# Patient Record
Sex: FEMALE | Race: WHITE | Employment: FULL TIME | URBAN - METROPOLITAN AREA
[De-identification: names, ages, dates, MRNs, and addresses within clinical notes are randomized per-mention and may not be internally consistent; named-entity substitution may affect disease eponyms.]

---

## 2018-07-22 ENCOUNTER — OFFICE VISIT (OUTPATIENT)
Dept: FAMILY MEDICINE CLINIC | Age: 23
End: 2018-07-22
Payer: COMMERCIAL

## 2018-07-22 VITALS
BODY MASS INDEX: 23.92 KG/M2 | SYSTOLIC BLOOD PRESSURE: 90 MMHG | OXYGEN SATURATION: 96 % | DIASTOLIC BLOOD PRESSURE: 60 MMHG | HEIGHT: 63 IN | HEART RATE: 63 BPM | TEMPERATURE: 98 F | WEIGHT: 135 LBS

## 2018-07-22 DIAGNOSIS — H61.22 IMPACTED CERUMEN OF LEFT EAR: ICD-10-CM

## 2018-07-22 DIAGNOSIS — J40 SINOBRONCHITIS: Primary | ICD-10-CM

## 2018-07-22 DIAGNOSIS — J32.9 SINOBRONCHITIS: Primary | ICD-10-CM

## 2018-07-22 PROCEDURE — 99213 OFFICE O/P EST LOW 20 MIN: CPT | Performed by: NURSE PRACTITIONER

## 2018-07-22 PROCEDURE — 69210 REMOVE IMPACTED EAR WAX UNI: CPT | Performed by: NURSE PRACTITIONER

## 2018-07-22 RX ORDER — DOXYCYCLINE HYCLATE 100 MG
100 TABLET ORAL 2 TIMES DAILY
Qty: 20 TABLET | Refills: 0 | Status: SHIPPED | OUTPATIENT
Start: 2018-07-22 | End: 2018-08-01

## 2018-07-22 RX ORDER — CITALOPRAM 10 MG/1
TABLET ORAL
COMMUNITY
Start: 2018-07-05 | End: 2020-01-30

## 2018-07-22 RX ORDER — NORGESTIMATE AND ETHINYL ESTRADIOL 7DAYSX3 28
KIT ORAL
COMMUNITY
Start: 2018-04-30 | End: 2020-03-16

## 2018-07-22 RX ORDER — METHYLPREDNISOLONE 4 MG/1
TABLET ORAL
Qty: 1 KIT | Refills: 0 | Status: SHIPPED | OUTPATIENT
Start: 2018-07-22 | End: 2018-07-28

## 2018-07-22 RX ORDER — SERTRALINE HYDROCHLORIDE 25 MG/1
TABLET, FILM COATED ORAL
COMMUNITY
Start: 2018-05-30 | End: 2021-03-17

## 2018-07-22 RX ORDER — CYCLOSPORINE 0.5 MG/ML
EMULSION OPHTHALMIC
COMMUNITY
Start: 2018-04-28 | End: 2021-07-03

## 2019-12-27 ENCOUNTER — HOSPITAL ENCOUNTER (OUTPATIENT)
Age: 24
Discharge: HOME | End: 2019-12-29
Payer: COMMERCIAL

## 2019-12-27 ENCOUNTER — OFFICE VISIT (OUTPATIENT)
Dept: FAMILY MEDICINE CLINIC | Age: 24
End: 2019-12-27
Payer: COMMERCIAL

## 2019-12-27 VITALS
WEIGHT: 149 LBS | OXYGEN SATURATION: 97 % | SYSTOLIC BLOOD PRESSURE: 122 MMHG | HEART RATE: 73 BPM | BODY MASS INDEX: 26.39 KG/M2 | DIASTOLIC BLOOD PRESSURE: 60 MMHG

## 2019-12-27 DIAGNOSIS — N76.0 ACUTE VAGINITIS: ICD-10-CM

## 2019-12-27 DIAGNOSIS — N76.0 ACUTE VAGINITIS: Primary | ICD-10-CM

## 2019-12-27 DIAGNOSIS — N89.8 VAGINAL DISCHARGE: ICD-10-CM

## 2019-12-27 LAB
BILIRUBIN, POC: NEGATIVE
BLOOD URINE, POC: NEGATIVE
CLARITY, POC: CLEAR
COLOR, POC: NORMAL
CONTROL: NORMAL
GLUCOSE URINE, POC: NORMAL
KETONES, POC: NEGATIVE
LEUKOCYTE EST, POC: NORMAL
NITRITE, POC: NEGATIVE
PH, POC: 6.5
PREGNANCY TEST URINE, POC: NORMAL
PROTEIN, POC: NEGATIVE
SPECIFIC GRAVITY, POC: 1.02
UROBILINOGEN, POC: 0.2

## 2019-12-27 PROCEDURE — 87491 CHLMYD TRACH DNA AMP PROBE: CPT

## 2019-12-27 PROCEDURE — 87088 URINE BACTERIA CULTURE: CPT

## 2019-12-27 PROCEDURE — 87070 CULTURE OTHR SPECIMN AEROBIC: CPT

## 2019-12-27 PROCEDURE — 81025 URINE PREGNANCY TEST: CPT | Performed by: FAMILY MEDICINE

## 2019-12-27 PROCEDURE — 99214 OFFICE O/P EST MOD 30 MIN: CPT | Performed by: FAMILY MEDICINE

## 2019-12-27 PROCEDURE — 87210 SMEAR WET MOUNT SALINE/INK: CPT

## 2019-12-27 PROCEDURE — 81002 URINALYSIS NONAUTO W/O SCOPE: CPT | Performed by: FAMILY MEDICINE

## 2019-12-27 PROCEDURE — 87591 N.GONORRHOEAE DNA AMP PROB: CPT

## 2019-12-27 RX ORDER — METRONIDAZOLE 500 MG/1
500 TABLET ORAL 2 TIMES DAILY
Qty: 14 TABLET | Refills: 0 | Status: SHIPPED | OUTPATIENT
Start: 2019-12-27 | End: 2020-01-03

## 2019-12-27 RX ORDER — FLUCONAZOLE 150 MG/1
TABLET ORAL
Qty: 2 TABLET | Refills: 0 | Status: SHIPPED | OUTPATIENT
Start: 2019-12-27 | End: 2020-01-30

## 2019-12-27 RX ORDER — VENLAFAXINE HYDROCHLORIDE 75 MG/1
CAPSULE, EXTENDED RELEASE ORAL
COMMUNITY
Start: 2019-12-17 | End: 2020-01-30

## 2019-12-30 LAB — URINE CULTURE, ROUTINE: NORMAL

## 2019-12-31 LAB
GENITAL CULTURE, ROUTINE: ABNORMAL
GENITAL CULTURE, ROUTINE: ABNORMAL
ORGANISM: ABNORMAL

## 2020-01-01 LAB — CULTURE, TRICHOMONAS: NORMAL

## 2020-01-02 ENCOUNTER — TELEPHONE (OUTPATIENT)
Dept: FAMILY MEDICINE CLINIC | Age: 25
End: 2020-01-02

## 2020-01-02 ENCOUNTER — TELEPHONE (OUTPATIENT)
Dept: PRIMARY CARE CLINIC | Age: 25
End: 2020-01-02

## 2020-01-02 RX ORDER — AZITHROMYCIN 500 MG/1
1000 TABLET, FILM COATED ORAL ONCE
Qty: 2 TABLET | Refills: 0 | Status: SHIPPED | OUTPATIENT
Start: 2020-01-02 | End: 2020-01-03

## 2020-01-03 LAB
C TRACH DNA GENITAL QL NAA+PROBE: ABNORMAL
N. GONORRHOEAE DNA: NEGATIVE
SOURCE: ABNORMAL

## 2020-01-03 RX ORDER — AZITHROMYCIN 500 MG/1
1000 TABLET, FILM COATED ORAL ONCE
Qty: 2 TABLET | Refills: 0 | Status: SHIPPED | OUTPATIENT
Start: 2020-01-03 | End: 2020-01-03

## 2020-01-30 ENCOUNTER — OFFICE VISIT (OUTPATIENT)
Dept: FAMILY MEDICINE CLINIC | Age: 25
End: 2020-01-30
Payer: COMMERCIAL

## 2020-01-30 VITALS
SYSTOLIC BLOOD PRESSURE: 122 MMHG | DIASTOLIC BLOOD PRESSURE: 80 MMHG | BODY MASS INDEX: 26.75 KG/M2 | HEART RATE: 80 BPM | TEMPERATURE: 97.9 F | WEIGHT: 151 LBS | OXYGEN SATURATION: 98 % | HEIGHT: 63 IN

## 2020-01-30 PROCEDURE — 99213 OFFICE O/P EST LOW 20 MIN: CPT | Performed by: FAMILY MEDICINE

## 2020-01-30 RX ORDER — DOXYCYCLINE HYCLATE 100 MG
100 TABLET ORAL 2 TIMES DAILY
Qty: 14 TABLET | Refills: 0 | Status: SHIPPED | OUTPATIENT
Start: 2020-01-30 | End: 2020-02-06

## 2020-01-30 RX ORDER — PREDNISONE 10 MG/1
TABLET ORAL
Qty: 30 TABLET | Refills: 0 | Status: SHIPPED | OUTPATIENT
Start: 2020-01-30 | End: 2020-02-11

## 2020-01-30 ASSESSMENT — ENCOUNTER SYMPTOMS
SORE THROAT: 0
SINUS PRESSURE: 1
VOICE CHANGE: 1
VOMITING: 0
WHEEZING: 0
EYE DISCHARGE: 0
RHINORRHEA: 0
SINUS PAIN: 1
ABDOMINAL PAIN: 0
COUGH: 0
CONSTIPATION: 0
NAUSEA: 0
SHORTNESS OF BREATH: 0
DIARRHEA: 0

## 2020-01-30 NOTE — PROGRESS NOTES
20  Laruel Carson : 1995 Sex: female  Age: 31 y.o. Chief Complaint   Patient presents with    Head Congestion     x1 week      HPI:  31 y.o. female presents for acute visit due to URI symptoms. Upper Respiratory Symptoms  Patient complains of congestion, nasal blockage, post nasal drip, bilateral sinus pain, hoarseness and sneezing. Patient denies anorexia, chest pain, chills, dizziness, fatigue, fevers, myalgias, nausea, shortness of breath, vomiting, cough and sputum production. She has had symptoms for 1 week. Symptoms have gradually worsening since that time. She has tried Claritin, Mucinex and steroid nasal spray at home with little improvement in symptoms. She denies a history of asthma. She has had recent close exposure to someone with similar symptoms. She works in the [employer] and is frequently around sick patients. Past history is significant for no history of pneumonia or bronchitis. Patient is non-smoker. ROS:  Review of Systems   Constitutional: Negative for appetite change, chills and fever. HENT: Positive for congestion, postnasal drip, sinus pressure, sinus pain, sneezing and voice change. Negative for ear pain, rhinorrhea and sore throat. Eyes: Negative for discharge. Respiratory: Negative for cough, shortness of breath and wheezing. Cardiovascular: Negative for chest pain and palpitations. Gastrointestinal: Negative for abdominal pain, constipation, diarrhea, nausea and vomiting. Musculoskeletal: Negative for myalgias. Skin: Negative for rash. Neurological: Negative for dizziness and headaches. All other systems reviewed and are negative.        Current Outpatient Medications:     doxycycline hyclate (VIBRA-TABS) 100 MG tablet, Take 1 tablet by mouth 2 times daily for 7 days, Disp: 14 tablet, Rfl: 0    predniSONE (DELTASONE) 10 MG tablet, Take 4 tablets by mouth daily for 3 days, THEN 3 tablets daily for 3 days, THEN 2 tablets daily for 3 days, THEN 1 tablet daily for 3 days. , Disp: 30 tablet, Rfl: 0    TRI-ESTARYLLA 0.18/0.215/0.25 MG-35 MCG TABS, , Disp: , Rfl:     ferrous sulfate 325 (65 FE) MG tablet, Take 1 tablet by mouth 2 times daily (with meals). , Disp: 30 tablet, Rfl: 11    RESTASIS 0.05 % ophthalmic emulsion, , Disp: , Rfl:     sertraline (ZOLOFT) 25 MG tablet, , Disp: , Rfl:     Allergies   Allergen Reactions    Penicillins Anaphylaxis    Cephalosporins        Past Medical History:   Diagnosis Date    Iron deficiency anemia due to chronic blood loss 5/4/2016    Exercise-induced, followed by  in [place]      History reviewed. No pertinent surgical history. History reviewed. No pertinent family history. Social History     Tobacco History     Smoking Status  Never Smoker    Smokeless Tobacco Use  Never Used                 Vitals:    01/30/20 1303   BP: 122/80   Pulse: 80   Temp: 97.9 °F (36.6 °C)   SpO2: 98%   Weight: 151 lb (68.5 kg)   Height: 5' 3\" (1.6 m)       Physical Exam:  Physical Exam  Vitals signs and nursing note reviewed. Constitutional:       General: She is not in acute distress. Appearance: She is well-developed and normal weight. She is ill-appearing. HENT:      Head: Normocephalic and atraumatic. Right Ear: Hearing and external ear normal. There is impacted cerumen. Left Ear: Hearing and external ear normal. There is impacted cerumen. Nose: Congestion present. No mucosal edema or rhinorrhea. Right Sinus: No maxillary sinus tenderness or frontal sinus tenderness. Left Sinus: No maxillary sinus tenderness or frontal sinus tenderness. Mouth/Throat:      Mouth: Mucous membranes are moist.      Pharynx: Oropharynx is clear. Posterior oropharyngeal erythema present. No oropharyngeal exudate. Eyes:      General: No scleral icterus. Right eye: No discharge. Left eye: No discharge. Extraocular Movements: Extraocular movements intact.

## 2021-03-17 ENCOUNTER — OFFICE VISIT (OUTPATIENT)
Dept: ENDOCRINOLOGY | Age: 26
End: 2021-03-17
Payer: COMMERCIAL

## 2021-03-17 VITALS
WEIGHT: 183 LBS | OXYGEN SATURATION: 97 % | SYSTOLIC BLOOD PRESSURE: 122 MMHG | TEMPERATURE: 97.9 F | HEART RATE: 78 BPM | BODY MASS INDEX: 32.42 KG/M2 | DIASTOLIC BLOOD PRESSURE: 74 MMHG

## 2021-03-17 DIAGNOSIS — R63.5 WEIGHT GAIN: Primary | ICD-10-CM

## 2021-03-17 DIAGNOSIS — N91.5 OLIGOMENORRHEA, UNSPECIFIED TYPE: ICD-10-CM

## 2021-03-17 PROCEDURE — G8427 DOCREV CUR MEDS BY ELIG CLIN: HCPCS | Performed by: INTERNAL MEDICINE

## 2021-03-17 PROCEDURE — 1036F TOBACCO NON-USER: CPT | Performed by: INTERNAL MEDICINE

## 2021-03-17 PROCEDURE — G8484 FLU IMMUNIZE NO ADMIN: HCPCS | Performed by: INTERNAL MEDICINE

## 2021-03-17 PROCEDURE — 99204 OFFICE O/P NEW MOD 45 MIN: CPT | Performed by: INTERNAL MEDICINE

## 2021-03-17 PROCEDURE — G8417 CALC BMI ABV UP PARAM F/U: HCPCS | Performed by: INTERNAL MEDICINE

## 2021-03-17 RX ORDER — DEXAMETHASONE 1 MG
1 TABLET ORAL ONCE
Qty: 1 TABLET | Refills: 0 | Status: SHIPPED | OUTPATIENT
Start: 2021-03-17 | End: 2021-03-17

## 2021-03-17 NOTE — PROGRESS NOTES
TK 1 C PO QD      RESTASIS 0.05 % ophthalmic emulsion       ferrous sulfate 325 (65 FE) MG tablet Take 1 tablet by mouth 2 times daily (with meals). 30 tablet 11     No current facility-administered medications for this visit. Review of Systems  Constitutional: No fever, no chills, no diaphoresis, no generalized weakness. HEENT: No blurred vision, No sore throat, no ear pain, no hair loss  Neck: denied any neck swelling, difficulty swallowing,   Cadrdiopulomary: No CP, SOB or palpitation, No orthopnea or PND. No cough or wheezing. GI: No N/V/D, no constipation, No abdominal pain, no melena or hematochezia   : Denied any dysuria, hematuria, flank pain, discharge, or incontinence. Skin: denied any rash, ulcer, Hirsute, or hyperpigmentation. MSK: denied any joint deformity, joint pain/swelling, muscle pain, or back pain. Neuro: no numbess, no tingling, no weakness,     OBJECTIVE    /74   Pulse 78   Temp 97.9 °F (36.6 °C)   Wt 183 lb (83 kg)   SpO2 97%   BMI 32.42 kg/m²   BP Readings from Last 4 Encounters:   03/17/21 122/74   03/01/21 135/85   02/17/21 117/79   02/08/21 121/79     Wt Readings from Last 6 Encounters:   03/17/21 183 lb (83 kg)   03/01/21 181 lb (82.1 kg)   02/17/21 182 lb (82.6 kg)   02/08/21 182 lb (82.6 kg)   11/23/20 180 lb (81.6 kg)   10/07/20 174 lb (78.9 kg)       Physical examination:  General: awake alert, oriented x3, no abnormal position or movements. HEENT: normocephalic non traumatic  Neck: supple, no LN enlargement, no thyromegaly, no thyroid tenderness, no JVD. Pulm: Clear equal air entry no added sounds, no wheezing or rhonchi    CVS: S1 + S2, no murmur, no heave. Dorsalis pedis pulse palpable   Abd: soft lax, no tenderness, no organomegaly, audible bowel sounds. Skin: warm, no lesions, no rash.   Musculoskeletal: No back tenderness, no kyphosis/scoliosis   Neuro: CN intact, sensation normal , muscle power normal.  Psych: normal mood, and affect    Review of Laboratory Data:  I have reviewed the following:  Lab Results   Component Value Date/Time    WBC 6.6 01/25/2015 02:32 AM    RBC 4.76 01/25/2015 02:32 AM    HGB 14.7 01/25/2015 02:32 AM    HCT 44.3 01/25/2015 02:32 AM    MCV 93.1 01/25/2015 02:32 AM    MCH 30.8 01/25/2015 02:32 AM    MCHC 33.1 01/25/2015 02:32 AM    RDW 12.0 01/25/2015 02:32 AM     01/25/2015 02:32 AM    MPV 8.4 01/25/2015 02:32 AM      Lab Results   Component Value Date/Time     01/25/2015 02:32 AM    K 2.9 (L) 01/25/2015 02:32 AM    CO2 22 01/25/2015 02:32 AM    BUN 24 (H) 01/25/2015 02:32 AM    CREATININE 0.9 01/25/2015 02:32 AM    CALCIUM 9.3 01/25/2015 02:32 AM    LABGLOM >60 01/25/2015 02:32 AM    GFRAA >60 01/25/2015 02:32 AM      No results found for: TSH, T4FREE, P4BQQFE, FT3, J5FIVHK, TSI, TPOABS, THGAB  Lab Results   Component Value Date    GLUCOSE 77 01/25/2015     No results found for: TRIG, HDL, LDLCALC, CHOL  No results found for: VITD25    ASSESSMENT & RECOMMENDATIONS   Laurel Carson, a 31 y.o.-old female seen in for following issues     Oligomenorrhea/hirsutism/wt gain   · Likely PCOS  · Unfortunately we can't do PCOS hormonal assessment while on contraceptive. · Encourage Wt loss   · To r/o other endocrine causes of Wt gain and oligomenorrhea, will obtain 1 mg DST   · TFT are normal     Weight gain    Will refer to our Wt loss clinic    Discussed lifestyle changes including diet and exercise with patient in depth. Also, discussed with patient cardiovascular risk associated with obesity    I personally reviewed external notes from PCP and other patient's care team providers, and personally interpreted labs associated with the above diagnosis. I also ordered labs to further assess and manage the above addressed medical conditions. Return if symptoms worsen or fail to improve, for wweight gain . The above issues were reviewed with the patient who understood and agreed with the plan.     Thank you for allowing us to participate in the care of this patient. Please do not hesitate to contact us with any additional questions. Diagnosis Orders   1. Weight gain  dexamethasone (DECADRON) 1 MG tablet    Cortisol Total    TSH without Reflex    T4, Free    Janina Trujillo MD, Bariatrics, [facility]   2. Oligomenorrhea, unspecified type       Monie Hawley MD  Endocrinologist, [facility] [place] and Endocrinology   [address] [place], [place] [address]   Phone: [phone]  Fax: [phone]  ------------------------------  An electronic signature was used to authenticate this note.  Jeffy Dean MD on 3/17/2021 at 3:47 PM

## 2021-03-18 DIAGNOSIS — R63.5 WEIGHT GAIN: Primary | ICD-10-CM

## 2021-03-18 RX ORDER — DEXAMETHASONE SODIUM PHOSPHATE 4 MG/ML
4 INJECTION, SOLUTION INTRA-ARTICULAR; INTRALESIONAL; INTRAMUSCULAR; INTRAVENOUS; SOFT TISSUE ONCE
Qty: 1 ML | Refills: 0
Start: 2021-03-18 | End: 2021-03-18 | Stop reason: CLARIF

## 2021-03-18 RX ORDER — DEXAMETHASONE 1 MG
TABLET ORAL
Qty: 1 TABLET | Refills: 0 | Status: SHIPPED
Start: 2021-03-18 | End: 2021-03-26

## 2021-03-21 PROBLEM — N91.5 OLIGOMENORRHEA: Status: ACTIVE | Noted: 2021-03-21

## 2021-03-24 ENCOUNTER — TELEPHONE (OUTPATIENT)
Dept: ENDOCRINOLOGY | Age: 26
End: 2021-03-24

## 2021-03-24 DIAGNOSIS — R63.5 WEIGHT GAIN: ICD-10-CM

## 2021-03-24 NOTE — TELEPHONE ENCOUNTER
Laurel called regarding lab results.       Electronically signed by Jacinta Ortiz on 3/24/2021 at 12:51 PM

## 2021-03-25 NOTE — TELEPHONE ENCOUNTER
Thyroid hormones are excellent and AM cortisol appropriately suppressed following 1 mg dexamethasone suppression test. This result essentially r/o hypercortisolism     Please keep your appointment with our Wt loss clinic.  I spoke with Dr. Rosamaria Cabot this AM and he will add her to his schedule soon

## 2021-03-25 NOTE — TELEPHONE ENCOUNTER
Patient was advised of results but still wants to speak with Dr. Alejandra Henao. Please call her today.   Thank you

## 2021-03-26 ENCOUNTER — OFFICE VISIT (OUTPATIENT)
Dept: BARIATRICS/WEIGHT MGMT | Age: 26
End: 2021-03-26
Payer: COMMERCIAL

## 2021-03-26 VITALS
DIASTOLIC BLOOD PRESSURE: 75 MMHG | BODY MASS INDEX: 32.07 KG/M2 | HEART RATE: 78 BPM | TEMPERATURE: 97 F | HEIGHT: 63 IN | SYSTOLIC BLOOD PRESSURE: 122 MMHG | WEIGHT: 181 LBS

## 2021-03-26 DIAGNOSIS — F32.9 REACTIVE DEPRESSION: Primary | ICD-10-CM

## 2021-03-26 DIAGNOSIS — E66.9 OBESITY (BMI 30.0-34.9): ICD-10-CM

## 2021-03-26 PROCEDURE — 99202 OFFICE O/P NEW SF 15 MIN: CPT

## 2021-03-26 PROCEDURE — G8428 CUR MEDS NOT DOCUMENT: HCPCS | Performed by: INTERNAL MEDICINE

## 2021-03-26 PROCEDURE — G8417 CALC BMI ABV UP PARAM F/U: HCPCS | Performed by: INTERNAL MEDICINE

## 2021-03-26 PROCEDURE — 99205 OFFICE O/P NEW HI 60 MIN: CPT | Performed by: INTERNAL MEDICINE

## 2021-03-26 PROCEDURE — G8484 FLU IMMUNIZE NO ADMIN: HCPCS | Performed by: INTERNAL MEDICINE

## 2021-03-26 PROCEDURE — 1036F TOBACCO NON-USER: CPT | Performed by: INTERNAL MEDICINE

## 2021-03-26 NOTE — PROGRESS NOTES
CC -   Depression, Obesity    BACKGROUND -   First visit: 3/26/21    · Depression   Since HS  On and off  Most recently treated Prozac 5 mg beginning in May 2020; this improved it  However, she gained 40 lbs over the 6 months; this has made her depression worse  Also she stopped the Prozac in Nov    · Obesity   Began in childhood  Initial BMI 32.1, Wt 181.0 lbs, 5'3\"  HS Grad wt 125 lbs  Lowest   wt 128 lbs  Highest  wt 181 lbs  Pattern of wt gain: rapid since 11/2021  Wt change past yr: +23 lbs per chart wts (pt states 40 lbs since May)  Most wt lost: 60 lbs (10th grade, exercise + eating less)  Other diets attempted: working with a dietician (two different ones)    Initial Diet (during the summer of wt gain):    Number of meals per day - 3    Number of snacks per day - 2    Meal volume - 9\" plate, sometimes seconds    Fast food/convenience store - 1-2x/week    Restaurants (not fast food) - 2x/week   Sweets - 0d/week   Chips - 3-5d/week (Quest protein chips 140 jigna, 18 g protein)   Crackers/pretzels - 0d/week   Nuts - 1d/week (on salads)   Peanut Butter - 1d/week (2 tablespoons in smoothie)   Popcorn - 3-5d/week (140 jigna pre-popped)   Dried fruit - 0d/week   Whole fruit - 2-3d/week (1 serving in smoothies))   Breakfast cereal - 1-2d/week (Yarsani instant, Low fat Brown Sugar)   Granola/Protein/Energy bar - 3d/week (Fit Crunch 190 jigna)   Sugar sweetened beverages - 1-2 shots of Vodka every other weekend   Protein - Quest protein chips, Smoothies with 1/2 scoop of protein powder 1-2 days/wk   Fiber - Fibercon intermittently     Exercise:    Gym membership - [employer] and [employer]    Walking - none    Running - 50 min, 1-2d/wk on treadmill; 50 min 1-2d/wk on eliptical    Resistance - 45-50 min, 2d/wk     Aerobic class - 50-60 min, 1-2d    ______________________    [facility] [place] [facility] -  Past Medical History:   Diagnosis Date    Iron deficiency anemia due to chronic blood loss 5/4/2016    Exercise-induced, followed by  in [facility] Current Outpatient Medications   Medication Sig Dispense Refill    Levonorgestrel (LILETTA, 52 MG, IU) by Intrauterine route      esomeprazole (NEXIUM) 40 MG delayed release capsule TK 1 C PO QD      RESTASIS 0.05 % ophthalmic emulsion       ferrous sulfate 325 (65 FE) MG tablet Take 1 tablet by mouth 2 times daily (with meals). 30 tablet 11     No current facility-administered medications for this visit. ROS -  Card - no CP  GI - no N/V/D    PE -  Gen : /75 (Site: Left Upper Arm, Position: Sitting, Cuff Size: Large Adult)   Pulse 78   Temp 97 °F (36.1 °C) (Temporal)   Ht 5' 3\" (1.6 m)   Wt 181 lb (82.1 kg)   BMI 32.06 kg/m²    WN, WD, NAD  Lung: Nml resp effort  Psych: Normal mood   Full affect  Neuro: Moves all ext well  ______________________    HISTORY & ASSESSMENT/PLAN -     Problem 1  - Depression  HPI   - See above Background for description      Her excess wt is exacerbating her depression  Assessment  - Uncontrolled  Plan   - Proceed with wt reduction per the plan below    Problem 2  - Obesity   HPI   - See above Background for description    Weight  Date    181.0 lbs 3/26/21  DEN = 1950 jigna/day =13,650 jigna/wk  Wt effect of trouble foods = Restaurant food 525 jigna/wk + Chips 1,120 + Protein bars 570 + Etoh 100 = 2315 jigna/wk = 330 jigna/d = 17% DEN = 33 lbs/yr  Wt effect of exercise = Treadmill/eliptical 1,050 + Spinning 525 = 1525 jigna/wk = 225 jigna/d   Pt confident that she is eating 1400 jigna/day and exercising several times/wk and not losing weight. After subtracting her avg daily energy expenditure from exercise, this would imply her caloric needs are only 1175 or there abouts. She is concerned that she has an endocrine problem. Cushings and hypothyroidism have already been ruled out. There is not an endocrinopathyr that will reduce her caloric needs to this amount. Therefore, I recommended a shake-based VLCD.  She does not want to do this b/c of it being incompatible with her lifestyle (eg, get togethers with friends and special events.) Also she states she will experience near syncope with exercising while following this. She will consider it and before making a decision. In case she opts for a non-VLCD approach, I gave her the requirements the other diet must meet. Cannot take an appetite suppressant with a stimulant and does not tolerate bupropion. She will be eating a very low fat diet, therefore orlistat will not be helpful. Jayme Phalen is cost prohibitive. Assessment  - Uncontrolled  Plan   -   Patient Instructions     Breakfast -     one high protein shake                            + 20 grams of fiber (12 tablespoons of the original, plain Fiber One cereal or 4 tablespoons of wheat dextrin powder (Benefiber or generic brand); for both of these, start with 1/4th the target amount and every week add another 1/4th until reaching the target)     Lunch -           one high protein shake                           + one a fat snack item     Dinner -          one frozen meal                           + one snack item     Shake options (<200 jigna, >22 grams/protein) :  Nectar, Pure Protein, Premier, Boost Max, Ensure Max, BeneProtein and [employer] (which is lactose-free) are milk-based options; Nectar, Premier Protein Clear, IsoPure Protein Drink, and Protein 2 O are water-based options; (Premier Protein Clear, the water-based option, comes in a 20 oz bottle with 20 grams of protein and 90 calories. So you have to drink three each day which increases the cost.)  (Disclaimer: Dietary supplements rarely have their listed ingredients and the amount of each verified by a third party other. Sometimes they give verification for their claims to be GMO and gluten free and to be organic.  However, even such verifications as these may still be untrustworthy.)     Fat snack options (<150 jigna, >11 grams of fat): 22 almonds, 1 1/2 tablespoon of a oil-based dressing or 4 tablespoons of [place] dressing on a bed of salad greens, 1 1/2 tablespoons of peanut butter     Snack options (<100 jigna, no sweets): fruit, low fat/high protein [place] yogurt, mozzarella cheese stick, nuts, salad with dressing, peanut butter, chips/crackers/pretzels     Frozen meal options (<350 jigna):  Weight Watchers Smart Ones, [employer] Cuisine, Healthy Choice, Delores's, Keyla's     Take a multivitamin daily     Walk 30 min every day    ---------------------------------    Do the above, or count calories and limit to 900 jigna/day (include 60 grams of protein, 22 grams of fiber and an 11 g bolus of fat).  Take a MV and walk at least 1.5 miles/day    See me back prn    Total time spent on encounter: 70 min    Sharlene Heath MD  Endocrinology/Obesity  3/26/21

## 2021-03-29 ENCOUNTER — TELEPHONE (OUTPATIENT)
Dept: ENDOCRINOLOGY | Age: 26
End: 2021-03-29

## 2021-07-03 ENCOUNTER — HOSPITAL ENCOUNTER (EMERGENCY)
Age: 26
Discharge: HOME | End: 2021-07-03
Attending: EMERGENCY MEDICINE
Payer: COMMERCIAL

## 2021-07-03 ENCOUNTER — APPOINTMENT (OUTPATIENT)
Dept: GENERAL RADIOLOGY | Age: 26
End: 2021-07-03
Payer: COMMERCIAL

## 2021-07-03 VITALS
BODY MASS INDEX: 30.12 KG/M2 | HEIGHT: 63 IN | HEART RATE: 72 BPM | SYSTOLIC BLOOD PRESSURE: 136 MMHG | WEIGHT: 170 LBS | OXYGEN SATURATION: 100 % | RESPIRATION RATE: 16 BRPM | TEMPERATURE: 97.5 F | DIASTOLIC BLOOD PRESSURE: 76 MMHG

## 2021-07-03 DIAGNOSIS — R07.89 ATYPICAL CHEST PAIN: Primary | ICD-10-CM

## 2021-07-03 LAB
ALBUMIN SERPL-MCNC: 4.4 G/DL (ref 3.5–5.2)
ALP BLD-CCNC: 55 U/L (ref 35–104)
ALT SERPL-CCNC: 18 U/L (ref 0–32)
ANION GAP SERPL CALCULATED.3IONS-SCNC: 8 MMOL/L (ref 7–16)
AST SERPL-CCNC: 21 U/L (ref 0–31)
BASOPHILS ABSOLUTE: 0.03 E9/L (ref 0–0.2)
BASOPHILS RELATIVE PERCENT: 0.5 % (ref 0–2)
BILIRUB SERPL-MCNC: 0.7 MG/DL (ref 0–1.2)
BUN BLDV-MCNC: 16 MG/DL (ref 6–20)
CALCIUM SERPL-MCNC: 9.6 MG/DL (ref 8.6–10.2)
CHLORIDE BLD-SCNC: 104 MMOL/L (ref 98–107)
CO2: 27 MMOL/L (ref 22–29)
CREAT SERPL-MCNC: 0.9 MG/DL (ref 0.5–1)
D DIMER: <200 NG/ML DDU
EOSINOPHILS ABSOLUTE: 0.05 E9/L (ref 0.05–0.5)
EOSINOPHILS RELATIVE PERCENT: 0.9 % (ref 0–6)
GFR AFRICAN AMERICAN: >60
GFR NON-AFRICAN AMERICAN: >60 ML/MIN/1.73
GLUCOSE BLD-MCNC: 80 MG/DL (ref 74–99)
HCT VFR BLD CALC: 45.5 % (ref 34–48)
HEMOGLOBIN: 15.6 G/DL (ref 11.5–15.5)
IMMATURE GRANULOCYTES #: 0.01 E9/L
IMMATURE GRANULOCYTES %: 0.2 % (ref 0–5)
LYMPHOCYTES ABSOLUTE: 2.65 E9/L (ref 1.5–4)
LYMPHOCYTES RELATIVE PERCENT: 47.3 % (ref 20–42)
MCH RBC QN AUTO: 31.1 PG (ref 26–35)
MCHC RBC AUTO-ENTMCNC: 34.3 % (ref 32–34.5)
MCV RBC AUTO: 90.6 FL (ref 80–99.9)
MONOCYTES ABSOLUTE: 0.37 E9/L (ref 0.1–0.95)
MONOCYTES RELATIVE PERCENT: 6.6 % (ref 2–12)
NEUTROPHILS ABSOLUTE: 2.49 E9/L (ref 1.8–7.3)
NEUTROPHILS RELATIVE PERCENT: 44.5 % (ref 43–80)
PDW BLD-RTO: 12.2 FL (ref 11.5–15)
PLATELET # BLD: 261 E9/L (ref 130–450)
PMV BLD AUTO: 10.6 FL (ref 7–12)
POTASSIUM REFLEX MAGNESIUM: 4 MMOL/L (ref 3.5–5)
RBC # BLD: 5.02 E12/L (ref 3.5–5.5)
SODIUM BLD-SCNC: 139 MMOL/L (ref 132–146)
TOTAL PROTEIN: 7.2 G/DL (ref 6.4–8.3)
TROPONIN, HIGH SENSITIVITY: <6 NG/L (ref 0–9)
WBC # BLD: 5.6 E9/L (ref 4.5–11.5)

## 2021-07-03 PROCEDURE — 93005 ELECTROCARDIOGRAM TRACING: CPT | Performed by: EMERGENCY MEDICINE

## 2021-07-03 PROCEDURE — 85378 FIBRIN DEGRADE SEMIQUANT: CPT

## 2021-07-03 PROCEDURE — 80053 COMPREHEN METABOLIC PANEL: CPT

## 2021-07-03 PROCEDURE — 85025 COMPLETE CBC W/AUTO DIFF WBC: CPT

## 2021-07-03 PROCEDURE — 36415 COLL VENOUS BLD VENIPUNCTURE: CPT

## 2021-07-03 PROCEDURE — 99283 EMERGENCY DEPT VISIT LOW MDM: CPT

## 2021-07-03 PROCEDURE — 71045 X-RAY EXAM CHEST 1 VIEW: CPT

## 2021-07-03 PROCEDURE — 84484 ASSAY OF TROPONIN QUANT: CPT

## 2021-07-03 RX ORDER — LEVOTHYROXINE SODIUM 0.03 MG/1
25 TABLET ORAL DAILY
COMMUNITY
End: 2021-08-20

## 2021-07-03 NOTE — ED PROVIDER NOTES
HPI:  7/3/21, Time: 1:54 PM EDT         Kim Aguilar is a 31 y.o. female presenting to the ED for shortness of breath and tachycardia. Patient also states she has some upper chest pressure and shortness of breath, beginning 2 days ago. The complaint has been persistent, moderate in severity, and worsened by running. Patient states that when she was working out yesterday and her heart rate increased to the 170s and she continued to have some chest pressure and feeling short of breath. She states a week ago she just got back from a long distance trip. Patient is nervous that she may have a pulmonary embolism due to these findings. Upon arrival patient was not tachycardic she was normotensive with oxygen saturation of 100%. Patient was states that her thyroid hormone level has recently changed and increased from his baseline 25 up to 50 and she is unsure if this is playing a part in her tachycardia and chest pressure. . Patient denies fever/chills, sore throat, cough, congestion,edema, headache, visual disturbances, focal paresthesias, focal weakness, abdominal pain, nausea, vomiting, diarrhea, constipation, dysuria, hematuria, trauma, neck or back pain, rash or other complaints. ROS:   A complete review of systems was performed and all pertinent positives and negatives are stated within HPI, all other systems reviewed and are negative.      --------------------------------------------- PAST HISTORY ---------------------------------------------  Past Medical History:  has a past medical history of Gastroesophageal reflux disease without esophagitis, Iron deficiency anemia due to chronic blood loss, Obesity (BMI 30.0-34.9), Reactive depression, and Thyroid disease. Past Surgical History:  has no past surgical history on file. Social History:  reports that she has never smoked. She has never used smokeless tobacco. She reports that she does not drink alcohol.     Family History: family history is not on file. The patients home medications have been reviewed. Allergies: Penicillins, Cephalosporins, and Sulfa antibiotics        ----------------------------------------PHYSICAL EXAM--------------------------------------  Constitutional:  Well developed, well nourished, no acute distress, non-toxic appearance   Eyes:  PERRL, conjunctiva normal, EOMI  HENT:  Atraumatic, external ears normal, nose normal, oropharynx moist, no pharyngeal exudates. Neck- normal range of motion, no nuchal rigidity   Respiratory:  No respiratory distress, normal breath sounds, no rales, no wheezing, equal air movement no retractions  Cardiovascular:  Normal rate, normal rhythm, no murmurs, no gallops, no rubs. Radial and DP pulses 2+ bilaterally. GI:  Soft, nondistended, normal bowel sounds, nontender, no organomegaly, no mass, no rebound, no guarding   :  No costovertebral angle tenderness   Musculoskeletal:  No edema, no tenderness, no deformities. Back- no tenderness  Integument:  Well hydrated, no rash. Adequate perfusion. Lymphatic:  No cervical lymphadenopathy noted   Neurologic:  Alert & oriented x 3, CN 2-12 normal, normal motor function, normal sensory function, no focal deficits noted. DTRs 2+ bilateral patellar. Normal gait. Psychiatric:  Speech and behavior appropriate       -------------------------------------------------- RESULTS -------------------------------------------------  I have personally reviewed all laboratory and imaging results for this patient. Results are listed below.      LABS:  Results for orders placed or performed during the hospital encounter of 07/03/21   CBC Auto Differential   Result Value Ref Range    WBC 5.6 4.5 - 11.5 E9/L    RBC 5.02 3.50 - 5.50 E12/L    Hemoglobin 15.6 (H) 11.5 - 15.5 g/dL    Hematocrit 45.5 34.0 - 48.0 %    MCV 90.6 80.0 - 99.9 fL    MCH 31.1 26.0 - 35.0 pg    MCHC 34.3 32.0 - 34.5 %    RDW 12.2 11.5 - 15.0 fL    Platelets 596 245 - 891 E9/L    MPV 10.6 7.0 - 12.0 fL    Neutrophils % 44.5 43.0 - 80.0 %    Immature Granulocytes % 0.2 0.0 - 5.0 %    Lymphocytes % 47.3 (H) 20.0 - 42.0 %    Monocytes % 6.6 2.0 - 12.0 %    Eosinophils % 0.9 0.0 - 6.0 %    Basophils % 0.5 0.0 - 2.0 %    Neutrophils Absolute 2.49 1.80 - 7.30 E9/L    Immature Granulocytes # 0.01 E9/L    Lymphocytes Absolute 2.65 1.50 - 4.00 E9/L    Monocytes Absolute 0.37 0.10 - 0.95 E9/L    Eosinophils Absolute 0.05 0.05 - 0.50 E9/L    Basophils Absolute 0.03 0.00 - 0.20 E9/L   Comprehensive Metabolic Panel w/ Reflex to MG   Result Value Ref Range    Sodium 139 132 - 146 mmol/L    Potassium reflex Magnesium 4.0 3.5 - 5.0 mmol/L    Chloride 104 98 - 107 mmol/L    CO2 27 22 - 29 mmol/L    Anion Gap 8 7 - 16 mmol/L    Glucose 80 74 - 99 mg/dL    BUN 16 6 - 20 mg/dL    CREATININE 0.9 0.5 - 1.0 mg/dL    GFR Non-African American >60 >=60 mL/min/1.73    GFR African American >60     Calcium 9.6 8.6 - 10.2 mg/dL    Total Protein 7.2 6.4 - 8.3 g/dL    Albumin 4.4 3.5 - 5.2 g/dL    Total Bilirubin 0.7 0.0 - 1.2 mg/dL    Alkaline Phosphatase 55 35 - 104 U/L    ALT 18 0 - 32 U/L    AST 21 0 - 31 U/L   Troponin   Result Value Ref Range    Troponin, High Sensitivity <6 0 - 9 ng/L   D-Dimer, Quantitative   Result Value Ref Range    D-Dimer, Quant <200 ng/mL DDU   EKG 12 Lead   Result Value Ref Range    Ventricular Rate 71 BPM    Atrial Rate 71 BPM    P-R Interval 186 ms    QRS Duration 108 ms    Q-T Interval 390 ms    QTc Calculation (Bazett) 423 ms    P Axis 32 degrees    R Axis 76 degrees    T Axis 40 degrees       RADIOLOGY:  Interpreted by Radiologist.  XR CHEST PORTABLE   Final Result   No pneumonia or pleural effusion.                EKG Interpretation  Time: 71  Rhythm: normal sinus   Rate: normal  Axis: normal  Conduction: right bundle branch block (incomplete)  ST Segments: no acute change  T Waves: no acute change  Clinical Impression: no acute changes  Comparison to prior EKG: stable as compared to patient's most recent EKG and None      ------------------------- NURSING NOTES AND VITALS REVIEWED ---------------------------  The nursing notes within the ED encounter and vital signs as below have been reviewed by myself. /76   Pulse 72   Temp 97.5 °F (36.4 °C) (Infrared)   Resp 16   Ht 5' 3\" (1.6 m)   Wt 170 lb (77.1 kg)   LMP 06/19/2021   SpO2 100%   BMI 30.11 kg/m²   Oxygen Saturation Interpretation: Normal      The patients available past medical records and past encounters were reviewed. ------------------------------ ED COURSE/MEDICAL DECISION MAKING----------------------  Medications - No data to display          Medical Decision Making:    Patient is a 25-year-old female due to risk factors unable to to be ruled out with clinical exam thus D-dimer was ordered which was negative for pulmonary embolism or clots of any kind. EKG was within normal limits and reassuring. Spoke with patient regarding recent increase in thyroid hormone in addition spoke with her regarding her ongoing possible diagnosis of Hashimoto's thyroiditis. Counseled patient to decrease her thyroid hormone back to its normal level that it was that 2 weeks ago to 25 until she get back into her PCP to be reevaluated. Did  her to continue to exercise regularly as she has been doing. On reassuring physical exam laboratory and imaging evaluation the patient discharged with follow-up to her PCP. Patient was explicitly instructed on specific signs and symptoms on which to return to the emergency room for. Patient was instructed to return to the ER for any new or worsening symptoms. Additional discharge instructions were given verbally. All questions were answered. Patient is comfortable and agreeable with discharge plan. Patient in no acute distress and non-toxic in appearance.        This patient's ED course included: a personal history and physicial examination, re-evaluation prior to disposition, multiple bedside re-evaluations, IV medications, cardiac monitoring, continuous pulse oximetry, complex medical decision making and emergency management and a personal history and physicial eaxmination    This patient has remained hemodynamically stable during their ED course. Re-Evaluations:  Time: 1342   Re-evaluation. Patients symptoms show no change  Repeat physical examination is not changed      Counseling: The emergency provider has spoken with the patient and discussed todays results, in addition to providing specific details for the plan of care and counseling regarding the diagnosis and prognosis. Questions are answered at this time and they are agreeable with the plan.    --------------------------- IMPRESSION AND DISPOSITION ---------------------------------    IMPRESSION  1.  Atypical chest pain        DISPOSITION  Disposition: Discharge to home  Patient condition is stable             Rohit Ewing DO  Resident  07/04/21 1091

## 2021-07-04 LAB
EKG ATRIAL RATE: 71 BPM
EKG P AXIS: 32 DEGREES
EKG P-R INTERVAL: 186 MS
EKG Q-T INTERVAL: 390 MS
EKG QRS DURATION: 108 MS
EKG QTC CALCULATION (BAZETT): 423 MS
EKG R AXIS: 76 DEGREES
EKG T AXIS: 40 DEGREES
EKG VENTRICULAR RATE: 71 BPM

## 2021-07-04 PROCEDURE — 93010 ELECTROCARDIOGRAM REPORT: CPT | Performed by: INTERNAL MEDICINE

## 2021-08-11 ENCOUNTER — TELEPHONE (OUTPATIENT)
Dept: PRIMARY CARE CLINIC | Age: 26
End: 2021-08-11

## 2021-08-11 NOTE — TELEPHONE ENCOUNTER
Pt requesting to establish with you. She was recommended by a current patient. Pt aware that you are only excepting by request and if you can not take her, she will need to establish with a dr that is excepting new patients. Aware you are out of the office and will not receive message until you are back in the office on 8/17.

## 2021-08-11 NOTE — TELEPHONE ENCOUNTER
----- Message from Anthony Shaikh sent at 8/10/2021  5:14 PM EDT -----  Subject: Message to Provider    QUESTIONS  Information for Provider? Patient would like to be scheduled with Dr. Tiffani Gonsales, however, there were only two appointments shown during search. They were not to the patient's liking. she is a new patient to Dr. Byron Montiel, and would like a call from staff to set up a new patient   appointment. Please call.   ---------------------------------------------------------------------------  --------------  CALL BACK INFO  What is the best way for the office to contact you? OK to leave message on   voicemail  Preferred Call Back Phone Number? [phone]  ---------------------------------------------------------------------------  --------------  SCRIPT ANSWERS  Relationship to Patient?  Self

## 2021-09-20 ENCOUNTER — OFFICE VISIT (OUTPATIENT)
Dept: PRIMARY CARE CLINIC | Age: 26
End: 2021-09-20
Payer: COMMERCIAL

## 2021-09-20 ENCOUNTER — TELEPHONE (OUTPATIENT)
Dept: PRIMARY CARE CLINIC | Age: 26
End: 2021-09-20

## 2021-09-20 VITALS
SYSTOLIC BLOOD PRESSURE: 124 MMHG | WEIGHT: 173 LBS | HEIGHT: 63 IN | OXYGEN SATURATION: 99 % | BODY MASS INDEX: 30.65 KG/M2 | DIASTOLIC BLOOD PRESSURE: 74 MMHG | HEART RATE: 70 BPM | TEMPERATURE: 97.6 F

## 2021-09-20 DIAGNOSIS — D50.9 IRON DEFICIENCY ANEMIA, UNSPECIFIED IRON DEFICIENCY ANEMIA TYPE: Primary | ICD-10-CM

## 2021-09-20 DIAGNOSIS — N94.819 VULVODYNIA: ICD-10-CM

## 2021-09-20 DIAGNOSIS — E03.9 HYPOTHYROIDISM, UNSPECIFIED TYPE: ICD-10-CM

## 2021-09-20 DIAGNOSIS — Z00.01 ENCOUNTER FOR WELL ADULT EXAM WITH ABNORMAL FINDINGS: Primary | ICD-10-CM

## 2021-09-20 PROBLEM — N91.5 OLIGOMENORRHEA: Status: RESOLVED | Noted: 2021-03-21 | Resolved: 2021-09-20

## 2021-09-20 PROCEDURE — 99385 PREV VISIT NEW AGE 18-39: CPT | Performed by: FAMILY MEDICINE

## 2021-09-20 RX ORDER — FERROUS SULFATE 137(45) MG
142 TABLET, EXTENDED RELEASE ORAL DAILY
COMMUNITY

## 2021-09-20 ASSESSMENT — ENCOUNTER SYMPTOMS
SHORTNESS OF BREATH: 0
ABDOMINAL PAIN: 0
DIARRHEA: 0
WHEEZING: 0
BACK PAIN: 0
COUGH: 0
VOMITING: 0
NAUSEA: 0
CONSTIPATION: 0

## 2021-09-20 NOTE — TELEPHONE ENCOUNTER
Pt calling asking if you can order a CBC stating she is interested in seeing what her HGB is .  She would go to [facility]

## 2021-09-20 NOTE — PROGRESS NOTES
21  Laurel Carson : 1995 Sex: female  Age: 31 y.o. Chief Complaint   Patient presents with   [address]     previous pcp dr Fatou Ann     HPI:  31 y.o. female presents today to establish care. Previously seen by Dr. Selma Burciaga. Patient's chart, medical, surgical and medication history all reviewed. Well Adult Physical  Patient here for a physical exam.  The patient reports problems - hypothyroidism with weight gain. Following with Dr. Yonatan Chan. Issues with vulvodynia. Thinking about second opinion at [facility]    Do you take any herbs or supplements that were not prescribed by a doctor? yes   Are you taking calcium supplements? no   Are you taking aspirin daily? no    Colonoscopy: No prior colonoscopy  Dental visit: Within last 6 mos  Vision check:  No Problems, Glasses  PAP:  [place]- UTD    Last time routine bloodwork was done: this month through [facility]    Immunization status: up to date and documented. Smoking status: never    Physical activity:  intermittently      ROS:  Review of Systems   Constitutional: Positive for unexpected weight change (gain). Negative for chills, fatigue and fever. Respiratory: Negative for cough, shortness of breath and wheezing. Cardiovascular: Negative for chest pain and palpitations. Gastrointestinal: Negative for abdominal pain, constipation, diarrhea, nausea and vomiting. Genitourinary: Positive for vaginal pain. Musculoskeletal: Negative for arthralgias and back pain. Skin: Negative for rash. Neurological: Negative for dizziness and headaches. Psychiatric/Behavioral: Negative for dysphoric mood. The patient is not nervous/anxious. All other systems reviewed and are negative.      Current Outpatient Medications on File Prior to Visit   Medication Sig Dispense Refill    ferrous sulfate (SLOW FE) 142 (45 Fe) MG extended release tablet Take 142 mg by mouth daily      levothyroxine (SYNTHROID) 50 MCG tablet levothyroxine 50 mcg tablet   1/2 tab once per day      vitamin D 25 MCG (1000 UT) CAPS Vitamin D3 25 mcg (1,000 unit) capsule   Take 1 capsule every day by oral route.  Ascorbic Acid (VITAMIN C PO) Vitamin C      Docusate Sodium (COLACE PO) Colace      TRI-ESTARYLLA 0.18/0.215/0.25 MG-35 MCG TABS Take 1 tablet by mouth daily 28 tablet 6    esomeprazole (NEXIUM) 40 MG delayed release capsule TK 1 C PO QD       No current facility-administered medications on file prior to visit. Allergies   Allergen Reactions    Penicillins Anaphylaxis    Cephalosporins     Sulfa Antibiotics        Past Medical History:   Diagnosis Date    Gastroesophageal reflux disease without esophagitis     Iron deficiency anemia due to chronic blood loss 05/04/2016    Exercise-induced, followed by  in [place]     Obesity (BMI 30.0-34. 9)     Reactive depression     Thyroid disease      History reviewed. No pertinent surgical history.   Family History   Problem Relation Age of Onset    Hashimoto Thyroiditis Mother     Rheum Arthritis Mother     Hypertension Mother     Hypertension Father     High Cholesterol Father     Hypothyroidism Maternal Grandmother     Breast Cancer Paternal Grandmother     Lung Cancer Paternal Grandmother     Hyperthyroidism Maternal Aunt     Hypothyroidism Maternal Aunt      Social History     Socioeconomic History    Marital status: Single     Spouse name: Not on file    Number of children: Not on file    Years of education: Not on file    Highest education level: Not on file   Occupational History    Not on file   Tobacco Use    Smoking status: Never Smoker    Smokeless tobacco: Never Used   Substance and Sexual Activity    Alcohol use: No    Drug use: Not on file    Sexual activity: Not on file   Other Topics Concern    Not on file   Social History Narrative    PMH - Denies    PSX - [place]    [address] - Denies except Paternal Grandmother Breast CA 80's        Social: [employer], STARTING 1/2020 Janina Single,No children     Social Determinants of Health     Financial Resource Strain:     Difficulty of Paying Living Expenses:    Food Insecurity:     Worried About Running Out of Food in the Last Year:     [address] in the Last Year:    Transportation Needs:     Lack of Transportation (Medical):  Lack of Transportation (Non-Medical):    Physical Activity:     Days of Exercise per Week:     Minutes of Exercise per Session:    Stress:     Feeling of Stress :    Social Connections:     Frequency of Communication with Friends and Family:     Frequency of Social Gatherings with Friends and Family:     Attends Christian Services:     Active Member of Clubs or Organizations:     Attends Club or Organization Meetings:     Marital Status:    Intimate Partner Violence:     Fear of Current or Ex-Partner:     Emotionally Abused:     Physically Abused:     Sexually Abused:        Vitals:    09/20/21 1532   BP: 124/74   Pulse: 70   Temp: 97.6 °F (36.4 °C)   SpO2: 99%   Weight: 173 lb (78.5 kg)   Height: 5' 3\" (1.6 m)       Physical Exam:  Physical Exam  Vitals and nursing note reviewed. Constitutional:       General: She is not in acute distress. Appearance: Normal appearance. She is well-developed. She is obese. She is not ill-appearing. HENT:      Head: Normocephalic and atraumatic. Right Ear: Hearing and external ear normal.      Left Ear: Hearing and external ear normal.      Nose:      Comments: Wearing mask  Eyes:      General: Lids are normal. No scleral icterus. Extraocular Movements: Extraocular movements intact. Conjunctiva/sclera: Conjunctivae normal.   Neck:      Thyroid: No thyromegaly. Cardiovascular:      Rate and Rhythm: Normal rate and regular rhythm. Heart sounds: Normal heart sounds. No murmur heard. Pulmonary:      Effort: Pulmonary effort is normal. No respiratory distress. Breath sounds: Normal breath sounds. No wheezing. Musculoskeletal:         General: No tenderness or deformity. Normal range of motion. Cervical back: Normal range of motion and neck supple. Right lower leg: No edema. Left lower leg: No edema. Lymphadenopathy:      Cervical: No cervical adenopathy. Skin:     General: Skin is warm and dry. Findings: No rash. Neurological:      General: No focal deficit present. Mental Status: She is alert and oriented to person, place, and time. Gait: Gait normal.   Psychiatric:         Mood and Affect: Mood and affect normal.         Speech: Speech normal.         Behavior: Behavior normal.         Thought Content:  Thought content normal.         Labs:  CBC with Differential:    Lab Results   Component Value Date    WBC 5.6 07/03/2021    RBC 5.02 07/03/2021    HGB 15.6 07/03/2021    HCT 45.5 07/03/2021     07/03/2021    MCV 90.6 07/03/2021    MCH 31.1 07/03/2021    MCHC 34.3 07/03/2021    RDW 12.2 07/03/2021    SEGSPCT 82 06/15/2013    LYMPHOPCT 47.3 07/03/2021    MONOPCT 6.6 07/03/2021    BASOPCT 0.5 07/03/2021    MONOSABS 0.37 07/03/2021    LYMPHSABS 2.65 07/03/2021    EOSABS 0.05 07/03/2021    BASOSABS 0.03 07/03/2021     CMP:    Lab Results   Component Value Date     07/03/2021    K 4.0 07/03/2021     07/03/2021    CO2 27 07/03/2021    BUN 16 07/03/2021    CREATININE 0.9 07/03/2021    GFRAA >60 07/03/2021    LABGLOM >60 07/03/2021    GLUCOSE 80 07/03/2021    PROT 7.2 07/03/2021    LABALBU 4.4 07/03/2021    CALCIUM 9.6 07/03/2021    BILITOT 0.7 07/03/2021    ALKPHOS 55 07/03/2021    AST 21 07/03/2021    ALT 18 07/03/2021     U/A:    Lab Results   Component Value Date    NITRITE neg 08/20/2021    COLORU yeellow 12/27/2019    COLORU YELLOW 06/16/2013    PROTEINU neg 08/20/2021    PROTEINU NEGATIVE 06/16/2013    PHUR 7.0 08/20/2021    PHUR 8.0 06/16/2013    WBCUA 0-1 06/16/2013    RBCUA 1-3 06/16/2013    BACTERIA RARE 06/16/2013    CLARITYU clear 12/27/2019    CLARITYU SLIGHTLY CLOUDY 06/16/2013    SPECGRAV 1.020 08/20/2021    SPECGRAV <=1.005 06/16/2013    LEUKOCYTESUR neg 08/20/2021    LEUKOCYTESUR NEGATIVE 06/16/2013    UROBILINOGEN 0.2 06/16/2013    BILIRUBINUR neg 08/20/2021    BLOODU neg 08/20/2021    BLOODU TRACE 06/16/2013    GLUCOSEU neg 08/20/2021    GLUCOSEU NEGATIVE 06/16/2013    AMORPHOUS FEW 06/16/2013     HgBA1c:  No results found for: LABA1C  FLP:  No results found for: TRIG, HDL, LDLCALC, LDLDIRECT, LABVLDL  TSH:  No results found for: TSH       Assessment and Plan:  Laurel was seen today for establish care. Diagnoses and all orders for this visit:    Encounter for well adult exam with abnormal findings  Overall healthy 30 yo female. UTD on HM. No need for labs at this time. Hypothyroidism, unspecified type  Following with endo. Strong family history    Vulvodynia  Second opinion at [facility] [place]      Return in about 1 year (around 9/20/2022), or if symptoms worsen or fail to improve, for Well visit.       Seen By:  Nolan Webb, DO

## 2021-10-07 ENCOUNTER — TELEPHONE (OUTPATIENT)
Dept: PRIMARY CARE CLINIC | Age: 26
End: 2021-10-07

## 2021-10-07 NOTE — TELEPHONE ENCOUNTER
Patient calling for an appt she is experiencing heart palpitations when she wakes up in the middle of the night she is not sleeping well. She was seen by endocrinology Dr. Andrea Moran and advised to see pcp. TSH 1.3 I offered appt Monday 2:00 that does not work with her schedule.

## 2021-10-11 ENCOUNTER — OFFICE VISIT (OUTPATIENT)
Dept: PRIMARY CARE CLINIC | Age: 26
End: 2021-10-11
Payer: COMMERCIAL

## 2021-10-11 VITALS
DIASTOLIC BLOOD PRESSURE: 80 MMHG | SYSTOLIC BLOOD PRESSURE: 122 MMHG | BODY MASS INDEX: 29.53 KG/M2 | HEART RATE: 70 BPM | TEMPERATURE: 97.7 F | WEIGHT: 173 LBS | HEIGHT: 64 IN | OXYGEN SATURATION: 98 %

## 2021-10-11 DIAGNOSIS — F41.1 GENERALIZED ANXIETY DISORDER: Primary | ICD-10-CM

## 2021-10-11 DIAGNOSIS — D50.9 IRON DEFICIENCY ANEMIA, UNSPECIFIED IRON DEFICIENCY ANEMIA TYPE: ICD-10-CM

## 2021-10-11 DIAGNOSIS — E55.9 VITAMIN D INSUFFICIENCY: ICD-10-CM

## 2021-10-11 PROCEDURE — G8427 DOCREV CUR MEDS BY ELIG CLIN: HCPCS | Performed by: FAMILY MEDICINE

## 2021-10-11 PROCEDURE — 1036F TOBACCO NON-USER: CPT | Performed by: FAMILY MEDICINE

## 2021-10-11 PROCEDURE — 99213 OFFICE O/P EST LOW 20 MIN: CPT | Performed by: FAMILY MEDICINE

## 2021-10-11 PROCEDURE — G8417 CALC BMI ABV UP PARAM F/U: HCPCS | Performed by: FAMILY MEDICINE

## 2021-10-11 PROCEDURE — G8484 FLU IMMUNIZE NO ADMIN: HCPCS | Performed by: FAMILY MEDICINE

## 2021-10-11 RX ORDER — TRAZODONE HYDROCHLORIDE 50 MG/1
50 TABLET ORAL NIGHTLY
Qty: 90 TABLET | Refills: 1 | Status: SHIPPED
Start: 2021-10-11 | End: 2021-11-29

## 2021-10-11 ASSESSMENT — ENCOUNTER SYMPTOMS
SHORTNESS OF BREATH: 0
CONSTIPATION: 0
BACK PAIN: 0
NAUSEA: 0
COUGH: 0
DIARRHEA: 0
VOMITING: 0
ABDOMINAL PAIN: 0
WHEEZING: 0

## 2021-10-11 NOTE — PROGRESS NOTES
10/11/21  Laurel Carson : 1995 Sex: female  Age: 31 y.o. Chief Complaint   Patient presents with    Anxiety    Urinary Frequency    Diarrhea     HPI:  31 y.o. female presents today for acute visit due to increased anxiety. Patient's chart, medical, surgical and medication history all reviewed. Anxiety  Patient complains of evaluation of anxiety disorder and sleep disturbance. She has the following anxiety symptoms: fatigue, feelings of losing control, insomnia, irritable, palpitations, racing thoughts, shakiness. Onset of symptoms was approximately 3 weeks ago, gradually worsening since that time. She denies current suicidal and homicidal ideation. Previous treatment includes BuSpar, Celexa, Lexapro, Paxil, Wellbutrin and Zoloft and no therapy. She complains of the following side effects from the treatment: weight gain and worsening anxiety. ROS:  Review of Systems   Constitutional: Negative for chills, fatigue and fever. Respiratory: Negative for cough, shortness of breath and wheezing. Cardiovascular: Positive for palpitations. Negative for chest pain. Gastrointestinal: Negative for abdominal pain, constipation, diarrhea, nausea and vomiting. Genitourinary: Positive for vaginal pain. Musculoskeletal: Negative for arthralgias and back pain. Skin: Negative for rash. Neurological: Negative for dizziness and headaches. Psychiatric/Behavioral: Positive for sleep disturbance. Negative for dysphoric mood. The patient is nervous/anxious. All other systems reviewed and are negative.      Current Outpatient Medications on File Prior to Visit   Medication Sig Dispense Refill    ferrous sulfate (SLOW FE) 142 (45 Fe) MG extended release tablet Take 142 mg by mouth daily      levothyroxine (SYNTHROID) 50 MCG tablet levothyroxine 50 mcg tablet   1/2 tab once per day      vitamin D 25 MCG (1000 UT) CAPS Vitamin D3 25 mcg (1,000 unit) capsule   Take 1 capsule every day by oral route.  Ascorbic Acid (VITAMIN C PO) Vitamin C      TRI-ESTARYLLA 0.18/0.215/0.25 MG-35 MCG TABS Take 1 tablet by mouth daily 28 tablet 6    esomeprazole (NEXIUM) 40 MG delayed release capsule TK 1 C PO QD       No current facility-administered medications on file prior to visit. Allergies   Allergen Reactions    Penicillins Anaphylaxis    Cephalosporins     Sulfa Antibiotics        Past Medical History:   Diagnosis Date    Gastroesophageal reflux disease without esophagitis     Iron deficiency anemia due to chronic blood loss 05/04/2016    Exercise-induced, followed by  in [place]     Obesity (BMI 30.0-34. 9)     Reactive depression     Thyroid disease      History reviewed. No pertinent surgical history.   Family History   Problem Relation Age of Onset    Hashimoto Thyroiditis Mother     Rheum Arthritis Mother     Hypertension Mother     Hypertension Father     High Cholesterol Father     Hypothyroidism Maternal Grandmother     Breast Cancer Paternal Grandmother     Lung Cancer Paternal Grandmother     Hyperthyroidism Maternal Aunt     Hypothyroidism Maternal Aunt      Social History     Socioeconomic History    Marital status: Single     Spouse name: Not on file    Number of children: Not on file    Years of education: Not on file    Highest education level: Not on file   Occupational History    Not on file   Tobacco Use    Smoking status: Never Smoker    Smokeless tobacco: Never Used   Substance and Sexual Activity    Alcohol use: No    Drug use: Not on file    Sexual activity: Not on file   Other Topics Concern    Not on file   Social History Narrative    PMH - Denies    PSX - [place] teeth    [address] - Denies except Paternal Grandmother Breast CA 80's        Social: [employer], STARTING 1/2020 [address] [facility] children     Social Determinants of Health     Financial Resource Strain:     Difficulty of Paying Living Expenses:    Food Insecurity:     Worried About Running Out of Food in the Last Year:    [address] in the Last Year:    Transportation Needs:     Lack of Transportation (Medical):  Lack of Transportation (Non-Medical):    Physical Activity:     Days of Exercise per Week:     Minutes of Exercise per Session:    Stress:     Feeling of Stress :    Social Connections:     Frequency of Communication with Friends and Family:     Frequency of Social Gatherings with Friends and Family:     Attends [employer] Services:     Active Member of Clubs or Organizations:     Attends Club or Organization Meetings:     Marital Status:    Intimate Partner Violence:     Fear of Current or Ex-Partner:     Emotionally Abused:     Physically Abused:     Sexually Abused:        Vitals:    10/11/21 1644   BP: 122/80   Pulse: 70   Temp: 97.7 °F (36.5 °C)   SpO2: 98%   Weight: 173 lb (78.5 kg)   Height: 5' 4\" (1.626 m)       Physical Exam:  Physical Exam  Vitals and nursing note reviewed. Constitutional:       General: She is not in acute distress. Appearance: Normal appearance. She is well-developed and normal weight. She is not ill-appearing. HENT:      Head: Normocephalic and atraumatic. Right Ear: Hearing and external ear normal.      Left Ear: Hearing and external ear normal.      Nose:      Comments: Wearing mask  Eyes:      General: Lids are normal. No scleral icterus. Extraocular Movements: Extraocular movements intact. Conjunctiva/sclera: Conjunctivae normal.   Neck:      Thyroid: No thyromegaly. Cardiovascular:      Rate and Rhythm: Normal rate and regular rhythm. Heart sounds: Normal heart sounds. No murmur heard. Pulmonary:      Effort: Pulmonary effort is normal. No respiratory distress. Breath sounds: Normal breath sounds. No wheezing. Musculoskeletal:         General: No tenderness or deformity. Normal range of motion. Cervical back: Normal range of motion and neck supple. Right lower leg: No edema. Left lower leg: No edema. Lymphadenopathy:      Cervical: No cervical adenopathy. Skin:     General: Skin is warm and dry. Findings: No rash. Neurological:      General: No focal deficit present. Mental Status: She is alert and oriented to person, place, and time. Gait: Gait normal.   Psychiatric:         Mood and Affect: Mood and affect normal.         Speech: Speech normal.         Behavior: Behavior normal.         Thought Content:  Thought content normal.         Labs:  CBC with Differential:    Lab Results   Component Value Date    WBC 5.6 07/03/2021    RBC 5.02 07/03/2021    HGB 15.6 07/03/2021    HCT 45.5 07/03/2021     07/03/2021    MCV 90.6 07/03/2021    MCH 31.1 07/03/2021    MCHC 34.3 07/03/2021    RDW 12.2 07/03/2021    SEGSPCT 82 06/15/2013    LYMPHOPCT 47.3 07/03/2021    MONOPCT 6.6 07/03/2021    BASOPCT 0.5 07/03/2021    MONOSABS 0.37 07/03/2021    LYMPHSABS 2.65 07/03/2021    EOSABS 0.05 07/03/2021    BASOSABS 0.03 07/03/2021     CMP:    Lab Results   Component Value Date     07/03/2021    K 4.0 07/03/2021     07/03/2021    CO2 27 07/03/2021    BUN 16 07/03/2021    CREATININE 0.9 07/03/2021    GFRAA >60 07/03/2021    LABGLOM >60 07/03/2021    GLUCOSE 80 07/03/2021    PROT 7.2 07/03/2021    LABALBU 4.4 07/03/2021    CALCIUM 9.6 07/03/2021    BILITOT 0.7 07/03/2021    ALKPHOS 55 07/03/2021    AST 21 07/03/2021    ALT 18 07/03/2021     U/A:    Lab Results   Component Value Date    NITRITE neg 09/24/2021    COLORU yeellow 12/27/2019    COLORU YELLOW 06/16/2013    PROTEINU neg 09/24/2021    PROTEINU NEGATIVE 06/16/2013    PHUR 5.0 09/24/2021    PHUR 8.0 06/16/2013    WBCUA 0-1 06/16/2013    RBCUA 1-3 06/16/2013    BACTERIA RARE 06/16/2013    CLARITYU clear 12/27/2019    CLARITYU SLIGHTLY CLOUDY 06/16/2013    SPECGRAV 1.025 09/24/2021    SPECGRAV <=1.005 06/16/2013    LEUKOCYTESUR neg 09/24/2021    LEUKOCYTESUR NEGATIVE 06/16/2013    UROBILINOGEN 0.2 06/16/2013 BILIRUBINUR neg 09/24/2021    BLOODU neg 09/24/2021    BLOODU TRACE 06/16/2013    GLUCOSEU neg 09/24/2021    GLUCOSEU NEGATIVE 06/16/2013    AMORPHOUS FEW 06/16/2013     HgBA1c:  No results found for: LABA1C  FLP:  No results found for: TRIG, HDL, LDLCALC, LDLDIRECT, LABVLDL  TSH:  No results found for: TSH       Assessment and Plan:  Laurel was seen today for anxiety, urinary frequency and diarrhea. Diagnoses and all orders for this visit:    Generalized anxiety disorder  -     traZODone (DESYREL) 50 MG tablet; Take 1 tablet by mouth nightly  -     CBC Auto Differential; Future  -     Comprehensive Metabolic Panel; Future  -     Vitamin B12 & Folate; Future  Discussed various options. Will try treating sleep first.  If no improvement then will try either Celexa or Viibryd. Check basic labs. TSH normal through Endo    Iron deficiency anemia, unspecified iron deficiency anemia type  -     Iron and TIBC; Future  -     Ferritin; Future    Vitamin D insufficiency  -     Vitamin D 25 Hydroxy; Future          Return in about 6 weeks (around 11/22/2021), or if symptoms worsen or fail to improve, for Recheck.       Seen By:  Mirlande Jensen, DO

## 2021-10-14 LAB
ALBUMIN SERPL-MCNC: 4.2 G/DL
ALP BLD-CCNC: 52 U/L
ALT SERPL-CCNC: 11 U/L
ANION GAP SERPL CALCULATED.3IONS-SCNC: NORMAL MMOL/L
AST SERPL-CCNC: 17 U/L
BASOPHILS ABSOLUTE: 30 /ΜL
BASOPHILS RELATIVE PERCENT: 0.5 %
BILIRUB SERPL-MCNC: 0.8 MG/DL (ref 0.1–1.4)
BUN BLDV-MCNC: 23 MG/DL
CALCIUM SERPL-MCNC: 9 MG/DL
CHLORIDE BLD-SCNC: 104 MMOL/L
CO2: 24 MMOL/L
CREAT SERPL-MCNC: 0.75 MG/DL
EOSINOPHILS ABSOLUTE: 42 /ΜL
EOSINOPHILS RELATIVE PERCENT: 0.7 %
FERRITIN: 104 NG/ML (ref 9–150)
GFR CALCULATED: 109
GLUCOSE BLD-MCNC: 90 MG/DL
HCT VFR BLD CALC: 41.5 % (ref 36–46)
HEMOGLOBIN: 14 G/DL (ref 12–16)
IRON: 177
LYMPHOCYTES ABSOLUTE: 2484 /ΜL
LYMPHOCYTES RELATIVE PERCENT: 41.1 %
MCH RBC QN AUTO: 31.1 PG
MCHC RBC AUTO-ENTMCNC: 33.7 G/DL
MCV RBC AUTO: 92.2 FL
MONOCYTES ABSOLUTE: 390 /ΜL
MONOCYTES RELATIVE PERCENT: 6.5 %
NEUTROPHILS ABSOLUTE: 3054 /ΜL
NEUTROPHILS RELATIVE PERCENT: 50.9 %
PDW BLD-RTO: 11.9 %
PLATELET # BLD: 253 K/ΜL
PMV BLD AUTO: 11.1 FL
POTASSIUM SERPL-SCNC: 3.8 MMOL/L
RBC # BLD: 4.5 10^6/ΜL
SODIUM BLD-SCNC: 137 MMOL/L
TOTAL IRON BINDING CAPACITY: 356
TOTAL PROTEIN: 6.9
VITAMIN D 25-HYDROXY: 42
VITAMIN D2, 25 HYDROXY: NORMAL
VITAMIN D3,25 HYDROXY: NORMAL
WBC # BLD: 6 10^3/ML

## 2021-10-19 ENCOUNTER — TELEPHONE (OUTPATIENT)
Dept: PRIMARY CARE CLINIC | Age: 26
End: 2021-10-19

## 2021-10-19 DIAGNOSIS — D50.9 IRON DEFICIENCY ANEMIA, UNSPECIFIED IRON DEFICIENCY ANEMIA TYPE: ICD-10-CM

## 2021-10-19 DIAGNOSIS — E55.9 VITAMIN D INSUFFICIENCY: ICD-10-CM

## 2021-10-19 DIAGNOSIS — F41.1 GENERALIZED ANXIETY DISORDER: ICD-10-CM

## 2021-10-19 NOTE — TELEPHONE ENCOUNTER
Spoke with pt to advise normal labs. She wanted to make you aware that she has decreased her iron due to her iron saturation being high. She also advised that the trazodone does not seem to be helping her insomnia. She states she will try it again tonight.

## 2021-11-09 ENCOUNTER — OFFICE VISIT (OUTPATIENT)
Dept: PRIMARY CARE CLINIC | Age: 26
End: 2021-11-09
Payer: COMMERCIAL

## 2021-11-09 VITALS
DIASTOLIC BLOOD PRESSURE: 74 MMHG | WEIGHT: 173 LBS | HEIGHT: 64 IN | OXYGEN SATURATION: 97 % | SYSTOLIC BLOOD PRESSURE: 118 MMHG | HEART RATE: 74 BPM | BODY MASS INDEX: 29.53 KG/M2 | TEMPERATURE: 97.2 F

## 2021-11-09 DIAGNOSIS — F41.1 GENERALIZED ANXIETY DISORDER: Primary | ICD-10-CM

## 2021-11-09 DIAGNOSIS — F51.04 PSYCHOPHYSIOLOGICAL INSOMNIA: ICD-10-CM

## 2021-11-09 PROCEDURE — G8484 FLU IMMUNIZE NO ADMIN: HCPCS | Performed by: FAMILY MEDICINE

## 2021-11-09 PROCEDURE — G8417 CALC BMI ABV UP PARAM F/U: HCPCS | Performed by: FAMILY MEDICINE

## 2021-11-09 PROCEDURE — 99213 OFFICE O/P EST LOW 20 MIN: CPT | Performed by: FAMILY MEDICINE

## 2021-11-09 PROCEDURE — 1036F TOBACCO NON-USER: CPT | Performed by: FAMILY MEDICINE

## 2021-11-09 PROCEDURE — G8427 DOCREV CUR MEDS BY ELIG CLIN: HCPCS | Performed by: FAMILY MEDICINE

## 2021-11-09 RX ORDER — ALPRAZOLAM 1 MG/1
1 TABLET ORAL NIGHTLY PRN
COMMUNITY
End: 2021-11-16

## 2021-11-09 ASSESSMENT — ENCOUNTER SYMPTOMS
SHORTNESS OF BREATH: 0
NAUSEA: 0
COUGH: 0
DIARRHEA: 0
CONSTIPATION: 0
ABDOMINAL PAIN: 0
VOMITING: 0
BACK PAIN: 0
WHEEZING: 0

## 2021-11-09 NOTE — PROGRESS NOTES
21  Laurel Carson : 1995 Sex: female  Age: 31 y.o. Chief Complaint   Patient presents with    Insomnia     pt states ativan 1mg is helping her to sleep but on the weekends she does not take any when drinking alcohol and she is up all night. HPI:  31 y.o. female presents today for 1 month follow up of anxiety and insomnia. Patient's chart, medical, surgical and medication history all reviewed. Anxiety  Patient complains of evaluation of anxiety disorder and sleep disturbance. She has the following anxiety symptoms: fatigue, feelings of losing control, insomnia, irritable, palpitations, racing thoughts, shakiness. Onset of symptoms was approximately 7 weeks ago (but long standing problem), gradually worsening since that time. She denies current suicidal and homicidal ideation. Previous treatment includes BuSpar, Celexa, Lexapro, Paxil, Wellbutrin and Zoloft and no therapy. She complains of the following side effects from the treatment: weight gain and worsening anxiety. She was started on Ativan about 2 weeks ago to be taken at bedtime. She really did not want to start SSRI due to potential for weight gain. Ativan does help with sleep during the week. Doesn't take on the weekends because she socially drinks. ROS:  Review of Systems   Constitutional: Negative for chills, fatigue and fever. Respiratory: Negative for cough, shortness of breath and wheezing. Cardiovascular: Negative for chest pain and palpitations. Gastrointestinal: Negative for abdominal pain, constipation, diarrhea, nausea and vomiting. Musculoskeletal: Negative for arthralgias and back pain. Skin: Negative for rash. Neurological: Negative for dizziness and headaches. Psychiatric/Behavioral: Positive for sleep disturbance. Negative for dysphoric mood. The patient is nervous/anxious. All other systems reviewed and are negative.      Current Outpatient Medications on File Prior to Visit Medication Sig Dispense Refill    ALPRAZolam (XANAX) 1 MG tablet Take 1 mg by mouth nightly as needed for Sleep.  LORazepam (ATIVAN) 0.5 MG tablet Take 1 tablet by mouth nightly as needed for Anxiety for up to 30 days. 30 tablet 1    tolterodine (DETROL LA) 4 MG extended release capsule TAKE 1 CAPSULE BY MOUTH EVERY DAY 30 capsule 3    traZODone (DESYREL) 50 MG tablet Take 1 tablet by mouth nightly 90 tablet 1    ferrous sulfate (SLOW FE) 142 (45 Fe) MG extended release tablet Take 142 mg by mouth daily      levothyroxine (SYNTHROID) 50 MCG tablet levothyroxine 50 mcg tablet   1/2 tab once per day      vitamin D 25 MCG (1000 UT) CAPS Vitamin D3 25 mcg (1,000 unit) capsule   Take 1 capsule every day by oral route.  Ascorbic Acid (VITAMIN C PO) Vitamin C      TRI-ESTARYLLA 0.18/0.215/0.25 MG-35 MCG TABS Take 1 tablet by mouth daily 28 tablet 6    esomeprazole (NEXIUM) 40 MG delayed release capsule TK 1 C PO QD       No current facility-administered medications on file prior to visit. Allergies   Allergen Reactions    Penicillins Anaphylaxis    Cephalosporins     Sulfa Antibiotics        Past Medical History:   Diagnosis Date    Gastroesophageal reflux disease without esophagitis     Iron deficiency anemia due to chronic blood loss 05/04/2016    Exercise-induced, followed by  in [place]     Obesity (BMI 30.0-34. 9)     Reactive depression     Thyroid disease      History reviewed. No pertinent surgical history.   Family History   Problem Relation Age of Onset    Hashimoto Thyroiditis Mother     Rheum Arthritis Mother     Hypertension Mother     Hypertension Father     High Cholesterol Father     Hypothyroidism Maternal Grandmother     Breast Cancer Paternal Grandmother     Lung Cancer Paternal Grandmother     Hyperthyroidism Maternal Aunt     Hypothyroidism Maternal Aunt      Social History     Socioeconomic History    Marital status: Single     Spouse name: Not on file    Number of children: Not on file    Years of education: Not on file    Highest education level: Not on file   Occupational History    Not on file   Tobacco Use    Smoking status: Never Smoker    Smokeless tobacco: Never Used   Substance and Sexual Activity    Alcohol use: No    Drug use: Not on file    Sexual activity: Not on file   Other Topics Concern    Not on file   Social History Narrative    PMH - Denies    PSX - [place] teeth    [address] - Denies except Paternal Grandmother Breast CA [de-identified]        Social: [employer], STARTING 1/2020 [address] children     Social Determinants of Health     Financial Resource Strain:     Difficulty of Paying Living Expenses: Not on file   Food Insecurity:     Worried About Running Out of Food in the Last Year: Not on file    [place] of Food in the Last Year: Not on file   Transportation Needs:     Lack of Transportation (Medical): Not on file    Lack of Transportation (Non-Medical):  Not on file   Physical Activity:     Days of Exercise per Week: Not on file    Minutes of Exercise per Session: Not on file   Stress:     Feeling of Stress : Not on file   Social Connections:     Frequency of Communication with Friends and Family: Not on file    Frequency of Social Gatherings with Friends and Family: Not on file    Attends Gnosticism Services: Not on file    Active Member of [address] [place] [address] or Organizations: Not on file    Attends Club or Organization Meetings: Not on file    Marital Status: Not on file   Intimate Partner Violence:     Fear of Current or Ex-Partner: Not on file    Emotionally Abused: Not on file    Physically Abused: Not on file    Sexually Abused: Not on file   Housing Stability:     Unable to Pay for Housing in the Last Year: Not on file    Number of [place] in the Last Year: Not on file    Unstable Housing in the Last Year: Not on file       Vitals:    11/09/21 1635   BP: 118/74   Pulse: 74   Temp: 97.2 °F (36.2 °C)   SpO2: 97%   Weight: 173 lb (78.5 kg)   Height: 5' 4\" (1.626 m)       Physical Exam:  Physical Exam  Vitals and nursing note reviewed. Constitutional:       General: She is not in acute distress. Appearance: Normal appearance. She is well-developed and normal weight. She is not ill-appearing. HENT:      Head: Normocephalic and atraumatic. Right Ear: Hearing and external ear normal.      Left Ear: Hearing and external ear normal.      Nose:      Comments: Wearing mask  Eyes:      General: Lids are normal. No scleral icterus. Extraocular Movements: Extraocular movements intact. Conjunctiva/sclera: Conjunctivae normal.   Neck:      Thyroid: No thyromegaly. Cardiovascular:      Rate and Rhythm: Normal rate and regular rhythm. Heart sounds: Normal heart sounds. No murmur heard. Pulmonary:      Effort: Pulmonary effort is normal. No respiratory distress. Breath sounds: Normal breath sounds. No wheezing. Musculoskeletal:         General: No tenderness or deformity. Normal range of motion. Cervical back: Normal range of motion and neck supple. Right lower leg: No edema. Left lower leg: No edema. Lymphadenopathy:      Cervical: No cervical adenopathy. Skin:     General: Skin is warm and dry. Findings: No rash. Neurological:      General: No focal deficit present. Mental Status: She is alert and oriented to person, place, and time. Gait: Gait normal.   Psychiatric:         Mood and Affect: Affect normal. Mood is anxious. Speech: Speech normal.         Behavior: Behavior is hyperactive. Thought Content:  Thought content normal.         Labs:  CBC with Differential:    Lab Results   Component Value Date    WBC 6.0 10/13/2021    RBC 4.50 10/13/2021    HGB 14 10/13/2021    HCT 41.5 10/13/2021     10/13/2021    MCV 92.2 10/13/2021    MCH 31.1 10/13/2021    MCHC 33.7 10/13/2021    RDW 11.9 10/13/2021    SEGSPCT 82 06/15/2013    LYMPHOPCT 41.1 10/13/2021 MONOPCT 6.5 10/13/2021    EOSPCT 0.7 10/13/2021    BASOPCT 0.5 10/13/2021    MONOSABS 390 10/13/2021    LYMPHSABS 2,484 10/13/2021    EOSABS 42 10/13/2021    BASOSABS 30 10/13/2021     CMP:    Lab Results   Component Value Date     10/13/2021    K 3.8 10/13/2021    K 4.0 07/03/2021     10/13/2021    CO2 24 10/13/2021    BUN 23 10/13/2021    CREATININE 0.75 10/13/2021    GFRAA >60 07/03/2021    LABGLOM 109 10/13/2021    LABGLOM >60 07/03/2021    GLUCOSE 90 10/13/2021    PROT 7.2 07/03/2021    LABALBU 4.2 10/13/2021    CALCIUM 9.0 10/13/2021    BILITOT 0.8 10/13/2021    ALKPHOS 52 10/13/2021    AST 17 10/13/2021    ALT 11 10/13/2021     U/A:    Lab Results   Component Value Date    NITRITE neg 09/24/2021    COLORU yeellow 12/27/2019    COLORU YELLOW 06/16/2013    PROTEINU neg 09/24/2021    PROTEINU NEGATIVE 06/16/2013    PHUR 5.0 09/24/2021    PHUR 8.0 06/16/2013    WBCUA 0-1 06/16/2013    RBCUA 1-3 06/16/2013    BACTERIA RARE 06/16/2013    CLARITYU clear 12/27/2019    CLARITYU SLIGHTLY CLOUDY 06/16/2013    SPECGRAV 1.025 09/24/2021    SPECGRAV <=1.005 06/16/2013    LEUKOCYTESUR neg 09/24/2021    LEUKOCYTESUR NEGATIVE 06/16/2013    UROBILINOGEN 0.2 06/16/2013    BILIRUBINUR neg 09/24/2021    BLOODU neg 09/24/2021    BLOODU TRACE 06/16/2013    GLUCOSEU neg 09/24/2021    GLUCOSEU NEGATIVE 06/16/2013    AMORPHOUS FEW 06/16/2013     HgBA1c:  No results found for: LABA1C  FLP:  No results found for: TRIG, HDL, LDLCALC, LDLDIRECT, LABVLDL  TSH:  No results found for: TSH       Assessment and Plan:  Laurel was seen today for insomnia. Diagnoses and all orders for this visit:    Generalized anxiety disorder    Psychophysiological insomnia    Highly suspect anxiety as main cause for her symptoms. She is getting some relief of insomnia with Ativan. WIll try to use for another month or more. If still really struggling, again stressed that we need to treat the anxiety at baseline.        Return in about 4 weeks (around 12/7/2021), or if symptoms worsen or fail to improve, for Recheck.       Seen By:  Aristeo Saleh, DO

## 2021-11-10 PROBLEM — F41.1 GENERALIZED ANXIETY DISORDER: Status: ACTIVE | Noted: 2021-11-10

## 2021-11-10 PROBLEM — F51.04 PSYCHOPHYSIOLOGICAL INSOMNIA: Status: ACTIVE | Noted: 2021-11-10

## 2022-04-13 ENCOUNTER — TELEPHONE (OUTPATIENT)
Dept: PRIMARY CARE CLINIC | Age: 27
End: 2022-04-13

## 2022-04-13 DIAGNOSIS — R00.0 TACHYCARDIA: Primary | ICD-10-CM

## 2022-04-13 NOTE — TELEPHONE ENCOUNTER
It sounds like she had a panic attack. We can put a 14 day holter monitor on her to see if anything else gets picked up.   She doesnt need to go to the ER if its been normal since

## 2022-04-13 NOTE — TELEPHONE ENCOUNTER
Patient calling at 4am she woke up her heart was racing 128 bpm for about 20 mins and then slowed to 110-100 since then it has been normal. She does feel off more so than usually. This has never happened before. She did take monistat last evening one dose at 11pm. Wanting to know your recommendations. She does not want to got to ER.

## 2022-04-14 NOTE — TELEPHONE ENCOUNTER
Pt agreeable to holter monitor. She had a few questions about the cost of the holter monitor but advised we do not handle any billing in our office and provided her with billing phone number. [employer]

## 2022-04-20 ENCOUNTER — TELEPHONE (OUTPATIENT)
Dept: NON INVASIVE DIAGNOSTICS | Age: 27
End: 2022-04-20

## 2022-04-20 ENCOUNTER — TELEPHONE (OUTPATIENT)
Dept: PRIMARY CARE CLINIC | Age: 27
End: 2022-04-20

## 2022-04-20 NOTE — TELEPHONE ENCOUNTER
MA spoke with Pt in regards to 14 day cardiac event monitor. Pt declines at this time and will contact office if she decides to follow through with monitor.     Electronically signed by Princess Peterson MA on 4/20/2022 at 8:54 AM

## 2022-04-26 ENCOUNTER — TELEPHONE (OUTPATIENT)
Dept: NON INVASIVE DIAGNOSTICS | Age: 27
End: 2022-04-26

## 2022-04-26 NOTE — TELEPHONE ENCOUNTER
Follow up on an ordered rcv'd for a heart monitor. Pt has previously declined.      Electronically signed by Ana Maria Frias MA on 4/26/2022 at 2:29 PM

## 2022-07-25 RX ORDER — ESOMEPRAZOLE MAGNESIUM 40 MG/1
40 CAPSULE, DELAYED RELEASE ORAL DAILY
Qty: 30 CAPSULE | Refills: 5 | Status: SHIPPED | OUTPATIENT
Start: 2022-07-25

## 2022-09-20 ENCOUNTER — OFFICE VISIT (OUTPATIENT)
Dept: PRIMARY CARE CLINIC | Age: 27
End: 2022-09-20
Payer: COMMERCIAL

## 2022-09-20 VITALS
HEIGHT: 63 IN | DIASTOLIC BLOOD PRESSURE: 74 MMHG | HEART RATE: 48 BPM | WEIGHT: 173 LBS | SYSTOLIC BLOOD PRESSURE: 112 MMHG | TEMPERATURE: 98.1 F | BODY MASS INDEX: 30.65 KG/M2 | OXYGEN SATURATION: 100 %

## 2022-09-20 DIAGNOSIS — M25.552 LEFT HIP PAIN: ICD-10-CM

## 2022-09-20 DIAGNOSIS — R00.2 PALPITATIONS: ICD-10-CM

## 2022-09-20 DIAGNOSIS — Z00.01 ENCOUNTER FOR WELL ADULT EXAM WITH ABNORMAL FINDINGS: Primary | ICD-10-CM

## 2022-09-20 DIAGNOSIS — E03.9 ACQUIRED HYPOTHYROIDISM: ICD-10-CM

## 2022-09-20 DIAGNOSIS — E55.9 VITAMIN D INSUFFICIENCY: ICD-10-CM

## 2022-09-20 DIAGNOSIS — R73.01 IMPAIRED FASTING GLUCOSE: ICD-10-CM

## 2022-09-20 DIAGNOSIS — F41.1 GENERALIZED ANXIETY DISORDER: ICD-10-CM

## 2022-09-20 DIAGNOSIS — D50.9 IRON DEFICIENCY ANEMIA, UNSPECIFIED IRON DEFICIENCY ANEMIA TYPE: ICD-10-CM

## 2022-09-20 PROCEDURE — 99395 PREV VISIT EST AGE 18-39: CPT | Performed by: FAMILY MEDICINE

## 2022-09-20 PROCEDURE — 99213 OFFICE O/P EST LOW 20 MIN: CPT | Performed by: FAMILY MEDICINE

## 2022-09-20 ASSESSMENT — ENCOUNTER SYMPTOMS
VOMITING: 0
NAUSEA: 0
BACK PAIN: 0
SHORTNESS OF BREATH: 0
COUGH: 0
ABDOMINAL PAIN: 0
CONSTIPATION: 0
WHEEZING: 0
DIARRHEA: 0

## 2022-09-20 NOTE — PROGRESS NOTES
22  Laurel Carson : 1995 Sex: female  Age: 28 y.o. Chief Complaint   Patient presents with    Annual Exam     HPI:  28 y.o. female presents today for yearly physical.  Patient's chart, medical, surgical and medication history all reviewed. Well Adult Physical  Patient here for a physical exam.  The patient reports problems - persistent difficulties losing weight. L inguinal/groin pain with radiation to left leg anteriorly. Patient is also still having issues with racing heart- HR up to 140s at rest.  Wakes her up in the middle of the night then causing anxiety/panic attacks. Do you take any herbs or supplements that were not prescribed by a doctor? yes   Are you taking calcium supplements? no   Are you taking aspirin daily? no    Colonoscopy: No prior colonoscopy  Dental visit: Within last 6 mos  Vision check:  No Problems, Glasses  PAP:  2022 with Dr. Devin Stout    Last time routine bloodwork was done:  2021    Immunization status: up to date and documented. Smoking status: never    Physical activity:  intermittently      ROS:  Review of Systems   Constitutional:  Negative for chills, fatigue, fever and unexpected weight change. Respiratory:  Negative for cough, shortness of breath and wheezing. Cardiovascular:  Positive for palpitations. Negative for chest pain. Gastrointestinal:  Negative for abdominal pain, constipation, diarrhea, nausea and vomiting. Musculoskeletal:  Positive for arthralgias. Negative for back pain. Skin:  Negative for rash. Neurological:  Negative for dizziness and headaches. Psychiatric/Behavioral:  Positive for sleep disturbance. Negative for dysphoric mood. The patient is nervous/anxious. All other systems reviewed and are negative. Current Outpatient Medications on File Prior to Visit   Medication Sig Dispense Refill    esomeprazole (NEXIUM) 40 MG delayed release capsule Take 1 capsule by mouth in the morning.  30 capsule 5    TRI-ESTARYLLA 0.18/0.215/0.25 MG-35 MCG TABS TAKE 1 TABLET BY MOUTH EVERY DAY 28 tablet 11    ferrous sulfate (SLOW FE) 142 (45 Fe) MG extended release tablet Take 142 mg by mouth daily      levothyroxine (SYNTHROID) 50 MCG tablet levothyroxine 50 mcg tablet   1/2 tab once per day      vitamin D 25 MCG (1000 UT) CAPS Vitamin D3 25 mcg (1,000 unit) capsule   Take 1 capsule every day by oral route. Ascorbic Acid (VITAMIN C PO) Vitamin C       No current facility-administered medications on file prior to visit. Allergies   Allergen Reactions    Penicillins Anaphylaxis    Cephalosporins     Sulfa Antibiotics        Past Medical History:   Diagnosis Date    Gastroesophageal reflux disease without esophagitis     Iron deficiency anemia due to chronic blood loss 05/04/2016    Exercise-induced, followed by  in [place]     Obesity (BMI 30.0-34. 9)     Reactive depression     Thyroid disease      History reviewed. No pertinent surgical history.   Family History   Problem Relation Age of Onset    Hashimoto Thyroiditis Mother     Rheum Arthritis Mother     Hypertension Mother     Hypertension Father     High Cholesterol Father     Hypothyroidism Maternal Grandmother     Breast Cancer Paternal Grandmother     Lung Cancer Paternal Grandmother     Hyperthyroidism Maternal Aunt     Hypothyroidism Maternal Aunt      Social History     Socioeconomic History    Marital status: Single     Spouse name: Not on file    Number of children: Not on file    Years of education: Not on file    Highest education level: Not on file   Occupational History    Not on file   Tobacco Use    Smoking status: Never    Smokeless tobacco: Never   Substance and Sexual Activity    Alcohol use: No    Drug use: Not on file    Sexual activity: Not on file   Other Topics Concern    Not on file   Social History Narrative    PMH - Denies    PSX - [place] teeth    [address] - Denies except Paternal Grandmother Breast CA [de-identified]        Social: [employer] 1/2020 [employer]        Single,No children     Social Determinants of Health     Financial Resource Strain: Not on file   Food Insecurity: Not on file   Transportation Needs: Not on file   Physical Activity: Not on file   Stress: Not on file   Social Connections: Not on file   Intimate Partner Violence: Not on file   Housing Stability: Not on file       Vitals:    09/20/22 1626   BP: 112/74   Pulse: (!) 48   Temp: 98.1 °F (36.7 °C)   SpO2: 100%   Weight: 173 lb (78.5 kg)   Height: 5' 3\" (1.6 m)       Physical Exam:  Physical Exam  Vitals and nursing note reviewed. Constitutional:       General: She is not in acute distress. Appearance: Normal appearance. She is well-developed. She is obese. She is not ill-appearing. HENT:      Head: Normocephalic and atraumatic. Right Ear: Hearing and external ear normal.      Left Ear: Hearing and external ear normal.      Nose:      Comments: Wearing mask  Eyes:      General: Lids are normal. No scleral icterus. Extraocular Movements: Extraocular movements intact. Conjunctiva/sclera: Conjunctivae normal.   Neck:      Thyroid: No thyromegaly. Cardiovascular:      Rate and Rhythm: Normal rate and regular rhythm. Heart sounds: Normal heart sounds. No murmur heard. Pulmonary:      Effort: Pulmonary effort is normal. No respiratory distress. Breath sounds: Normal breath sounds. No wheezing. Musculoskeletal:         General: No tenderness or deformity. Normal range of motion. Cervical back: Normal range of motion and neck supple. Right lower leg: No edema. Left lower leg: No edema. Lymphadenopathy:      Cervical: No cervical adenopathy. Skin:     General: Skin is warm and dry. Findings: No rash. Neurological:      General: No focal deficit present. Mental Status: She is alert and oriented to person, place, and time.       Gait: Gait normal.   Psychiatric:         Mood and Affect: Mood and affect normal.         Speech: Speech normal.         Behavior: Behavior normal.         Thought Content:  Thought content normal.       Labs:  CBC with Differential:    Lab Results   Component Value Date/Time    WBC 6.0 10/13/2021 12:00 AM    RBC 4.50 10/13/2021 12:00 AM    HGB 14 10/13/2021 12:00 AM    HCT 41.5 10/13/2021 12:00 AM     10/13/2021 12:00 AM    MCV 92.2 10/13/2021 12:00 AM    MCH 31.1 10/13/2021 12:00 AM    MCHC 33.7 10/13/2021 12:00 AM    RDW 11.9 10/13/2021 12:00 AM    SEGSPCT 82 06/15/2013 09:00 PM    LYMPHOPCT 41.1 10/13/2021 12:00 AM    MONOPCT 6.5 10/13/2021 12:00 AM    EOSPCT 0.7 10/13/2021 12:00 AM    BASOPCT 0.5 10/13/2021 12:00 AM    MONOSABS 390 10/13/2021 12:00 AM    LYMPHSABS 2,484 10/13/2021 12:00 AM    EOSABS 42 10/13/2021 12:00 AM    BASOSABS 30 10/13/2021 12:00 AM     CMP:    Lab Results   Component Value Date/Time     10/13/2021 12:00 AM    K 3.8 10/13/2021 12:00 AM    K 4.0 07/03/2021 03:20 PM     10/13/2021 12:00 AM    CO2 24 10/13/2021 12:00 AM    BUN 23 10/13/2021 12:00 AM    CREATININE 0.75 10/13/2021 12:00 AM    GFRAA >60 07/03/2021 03:20 PM    LABGLOM 109 10/13/2021 12:00 AM    LABGLOM >60 07/03/2021 03:20 PM    GLUCOSE 90 10/13/2021 12:00 AM    PROT 7.2 07/03/2021 03:20 PM    LABALBU 4.2 10/13/2021 12:00 AM    CALCIUM 9.0 10/13/2021 12:00 AM    BILITOT 0.8 10/13/2021 12:00 AM    ALKPHOS 52 10/13/2021 12:00 AM    AST 17 10/13/2021 12:00 AM    ALT 11 10/13/2021 12:00 AM     U/A:    Lab Results   Component Value Date/Time    NITRITE positive 12/15/2021 11:46 AM    COLORU yeellow 12/27/2019 11:56 AM    COLORU YELLOW 06/16/2013 12:10 AM    PROTEINU 30 12/15/2021 11:46 AM    PROTEINU NEGATIVE 06/16/2013 12:10 AM    PHUR 5.0 12/15/2021 11:46 AM    PHUR 8.0 06/16/2013 12:10 AM    WBCUA 0-1 06/16/2013 12:10 AM    RBCUA 1-3 06/16/2013 12:10 AM    BACTERIA RARE 06/16/2013 12:10 AM    CLARITYU clear 12/27/2019 11:56 AM    CLARITYU SLIGHTLY CLOUDY 06/16/2013 12:10 AM    SPECGRAV 1.020 12/15/2021 11:46 AM    SPECGRAV <=1.005 06/16/2013 12:10 AM    LEUKOCYTESUR negative 12/15/2021 11:46 AM    LEUKOCYTESUR NEGATIVE 06/16/2013 12:10 AM    UROBILINOGEN 0.2 06/16/2013 12:10 AM    BILIRUBINUR negative 12/15/2021 11:46 AM    BLOODU negative 12/15/2021 11:46 AM    BLOODU TRACE 06/16/2013 12:10 AM    GLUCOSEU 100 12/15/2021 11:46 AM    GLUCOSEU NEGATIVE 06/16/2013 12:10 AM    AMORPHOUS FEW 06/16/2013 12:10 AM     HgBA1c:  No results found for: LABA1C  FLP:  No results found for: TRIG, HDL, LDLCALC, LDLDIRECT, LABVLDL  TSH:  No results found for: TSH       Assessment and Plan:  Laurel was seen today for annual exam.    Diagnoses and all orders for this visit:    Encounter for well adult exam with abnormal findings  -     CBC with Auto Differential; Future  -     Comprehensive Metabolic Panel; Future  -     Hemoglobin A1C; Future  -     Lipid Panel; Future  -     TSH; Future  -     Vitamin D 25 Hydroxy; Future  -     Urinalysis; Future  -     T4, Free; Future  -     Iron and TIBC; Future  -     Ferritin; Future  -     LOIS; Future  Healthy 29 yo female. UTD on HM. HR on watch at 66- has false nails/thick nail polish on. Due for routine labs. Left hip pain  -     Amb External Referral To Sports Medicine  -     LOIS; Future  Suspect psoas syndrome with QL spasm. Will send to Dr. Bang Vera for OMT to seen if releasing the psoas is more beneficial then the PT/Pelvic floor therapy/intra-vaginal Gabapentin that she has either already completed or been suggested to her. Palpitations  -     Cardiac event monitor; Future  -     LOIS; Future  Patient continues to have episodes at random and in the middle of the night. Suggested the possibility of NATHALIA, but will try to rule out with changes to heart monitor. Acquired hypothyroidism  -     TSH; Future  -     T4, Free; Future  -     Sedimentation Rate; Future  -     Thyroid Peroxidase Antibody; Future  -     C-Reactive Protein;  Future    Generalized anxiety disorder  Reluctant to be on medications. Iron deficiency anemia, unspecified iron deficiency anemia type  -     Iron and TIBC; Future  -     Ferritin; Future    Impaired fasting glucose  -     Hemoglobin A1C; Future    Vitamin D insufficiency  -     Vitamin D 25 Hydroxy; Future          Return after results of heart monitor.       Seen By:  Robbin Andres DO

## 2022-09-21 ENCOUNTER — TELEPHONE (OUTPATIENT)
Dept: NON INVASIVE DIAGNOSTICS | Age: 27
End: 2022-09-21

## 2022-09-21 NOTE — TELEPHONE ENCOUNTER
MA spoke with Pt in regards to heart monitor. Address verified and order submitted via [employer].      Electronically signed by Calli Reynaga MA on 9/21/2022 at 3:07 PM

## 2022-09-27 LAB
ALBUMIN SERPL-MCNC: 4.1 G/DL
ALP BLD-CCNC: 43 U/L
ALT SERPL-CCNC: 13 U/L
ANA TITER: NORMAL
ANION GAP SERPL CALCULATED.3IONS-SCNC: NORMAL MMOL/L
AST SERPL-CCNC: 16 U/L
AVERAGE GLUCOSE: NORMAL
BASOPHILS ABSOLUTE: 31 /ΜL
BASOPHILS RELATIVE PERCENT: 0.5 %
BILIRUB SERPL-MCNC: 0.7 MG/DL (ref 0.1–1.4)
BILIRUBIN, URINE: NEGATIVE
BLOOD, URINE: NEGATIVE
BUN BLDV-MCNC: 16 MG/DL
C-REACTIVE PROTEIN: 6.1
CALCIUM SERPL-MCNC: 9.3 MG/DL
CHLORIDE BLD-SCNC: 104 MMOL/L
CHOLESTEROL, TOTAL: 157 MG/DL
CHOLESTEROL/HDL RATIO: 7.8
CLARITY: CLEAR
CO2: 27 MMOL/L
COLOR: YELLOW
CREAT SERPL-MCNC: 0.88 MG/DL
EOSINOPHILS ABSOLUTE: 19 /ΜL
EOSINOPHILS RELATIVE PERCENT: 0.3 %
FERRITIN: 81 NG/ML (ref 9–150)
GFR CALCULATED: 92
GLUCOSE BLD-MCNC: 79 MG/DL
GLUCOSE URINE: NORMAL
HBA1C MFR BLD: 4.8 %
HCT VFR BLD CALC: 42.1 % (ref 36–46)
HDLC SERPL-MCNC: 86 MG/DL (ref 35–70)
HEMOGLOBIN: 14 G/DL (ref 12–16)
IRON: 122
KETONES, URINE: NEGATIVE
LDL CHOLESTEROL CALCULATED: 53 MG/DL (ref 0–160)
LEUKOCYTE ESTERASE, URINE: NEGATIVE
LYMPHOCYTES ABSOLUTE: 2461 /ΜL
LYMPHOCYTES RELATIVE PERCENT: 39.7 %
MCH RBC QN AUTO: 30.6 PG
MCHC RBC AUTO-ENTMCNC: 33.3 G/DL
MCV RBC AUTO: 91.9 FL
MONOCYTES ABSOLUTE: 372 /ΜL
MONOCYTES RELATIVE PERCENT: 6 %
NEUTROPHILS ABSOLUTE: 3317 /ΜL
NEUTROPHILS RELATIVE PERCENT: 53.5 %
NITRITE, URINE: NEGATIVE
NONHDLC SERPL-MCNC: ABNORMAL MG/DL
PDW BLD-RTO: 12.5 %
PH UA: 6.5 (ref 4.5–8)
PLATELET # BLD: 268 K/ΜL
PMV BLD AUTO: 11 FL
POTASSIUM SERPL-SCNC: 4.1 MMOL/L
PROTEIN UA: NEGATIVE
RBC # BLD: 4.58 10^6/ΜL
SEDIMENTATION RATE, ERYTHROCYTE: <1
SODIUM BLD-SCNC: 138 MMOL/L
SPECIFIC GRAVITY, URINE: 1.01
T4 FREE: 1.2
TOTAL IRON BINDING CAPACITY: 408
TOTAL PROTEIN: 6.7
TRIGL SERPL-MCNC: 97 MG/DL
TSH SERPL DL<=0.05 MIU/L-ACNC: 3.45 UIU/ML
UROBILINOGEN, URINE: NORMAL
VITAMIN D 25-HYDROXY: 50
VITAMIN D2, 25 HYDROXY: NORMAL
VITAMIN D3,25 HYDROXY: NORMAL
VLDLC SERPL CALC-MCNC: 71 MG/DL
WBC # BLD: 6.2 10^3/ML

## 2022-09-28 ENCOUNTER — TELEPHONE (OUTPATIENT)
Dept: PRIMARY CARE CLINIC | Age: 27
End: 2022-09-28

## 2022-09-28 DIAGNOSIS — E55.9 VITAMIN D INSUFFICIENCY: ICD-10-CM

## 2022-09-28 DIAGNOSIS — Z00.01 ENCOUNTER FOR WELL ADULT EXAM WITH ABNORMAL FINDINGS: ICD-10-CM

## 2022-09-28 DIAGNOSIS — D50.9 IRON DEFICIENCY ANEMIA, UNSPECIFIED IRON DEFICIENCY ANEMIA TYPE: ICD-10-CM

## 2022-09-28 DIAGNOSIS — R00.2 PALPITATIONS: ICD-10-CM

## 2022-09-28 DIAGNOSIS — R76.8 ELEVATED ANTINUCLEAR ANTIBODY (ANA) LEVEL: Primary | ICD-10-CM

## 2022-09-28 DIAGNOSIS — E03.9 ACQUIRED HYPOTHYROIDISM: ICD-10-CM

## 2022-09-28 DIAGNOSIS — M25.552 LEFT HIP PAIN: ICD-10-CM

## 2022-09-28 DIAGNOSIS — R73.01 IMPAIRED FASTING GLUCOSE: ICD-10-CM

## 2022-09-28 NOTE — TELEPHONE ENCOUNTER
Advised patient she is asking who you would refer too?  She also going to check with her insurance as to who they cover

## 2022-09-28 NOTE — TELEPHONE ENCOUNTER
Pt would like ref to Dr Tootie Albert.  She also wanted to let you know she has not received her heart monitor that you ordered approx 1 week ago

## 2022-09-29 NOTE — TELEPHONE ENCOUNTER
Spoke to [facility] cardiology they are going to reprint and give to  to call her

## 2022-10-04 ENCOUNTER — TELEPHONE (OUTPATIENT)
Dept: PRIMARY CARE CLINIC | Age: 27
End: 2022-10-04

## 2022-10-04 NOTE — TELEPHONE ENCOUNTER
If it is causing that much of a reaction, then stop wear, we'll try to get as much information as we can from what time she did wear it

## 2022-10-04 NOTE — TELEPHONE ENCOUNTER
----- Message from Chava Flores sent at 10/4/2022  4:30 PM EDT -----  Subject: Message to Provider    QUESTIONS  Information for Provider? PT has a heart monitor on and its causing her to   be super itchy and where the electrobe goes its causing her skin to be   really enflamed. She doesn't know if she really needs to keep this on for   14 days ? Shes only had it on for 3 days and its causing contact dermitis   to her skin. If she does have to keep it on for 14 days can she take it   off on day 13 because she has a wedding on day 14.   ---------------------------------------------------------------------------  --------------  Beti LANGFORD  [phone]; OK to leave message on voicemail  ---------------------------------------------------------------------------  --------------  SCRIPT ANSWERS  Relationship to Patient?  Self

## 2022-10-06 NOTE — TELEPHONE ENCOUNTER
Patient advised she is using hydrocortisone cream on the areas wanting to know if you would recommend anything different.

## 2022-10-17 DIAGNOSIS — R00.2 PALPITATIONS: ICD-10-CM

## 2022-11-17 ENCOUNTER — TELEPHONE (OUTPATIENT)
Dept: FAMILY MEDICINE CLINIC | Age: 27
End: 2022-11-17

## 2022-11-17 NOTE — TELEPHONE ENCOUNTER
Patient is requesting a Friday appt (not this Friday) any time after 1PM to discuss anxiety and depression. States that she is not excited about anything, including an upcoming trip to [facility] [place]. She has experienced this in the past in the Winter months. Has not been able to find a counselor in the area. Scheduled to see sleep medicine 12/23/22. Has tried 4 SSRIs and 3 SNRIs while in PA school but the medication caused weight gain. She is also being worked up for PCOS. Using [facility] in [place] but not comfortable with starting a medication without first discussing it with you. 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 She may not be able to take calls during office hours but can respond through [address]

## 2022-11-25 ENCOUNTER — OFFICE VISIT (OUTPATIENT)
Dept: FAMILY MEDICINE CLINIC | Age: 27
End: 2022-11-25
Payer: COMMERCIAL

## 2022-11-25 VITALS
DIASTOLIC BLOOD PRESSURE: 86 MMHG | RESPIRATION RATE: 16 BRPM | SYSTOLIC BLOOD PRESSURE: 122 MMHG | WEIGHT: 175 LBS | HEIGHT: 63 IN | OXYGEN SATURATION: 98 % | BODY MASS INDEX: 31.01 KG/M2 | TEMPERATURE: 98.7 F | HEART RATE: 77 BPM

## 2022-11-25 DIAGNOSIS — F41.1 GENERALIZED ANXIETY DISORDER: Primary | ICD-10-CM

## 2022-11-25 DIAGNOSIS — R63.5 WEIGHT GAIN: ICD-10-CM

## 2022-11-25 DIAGNOSIS — F51.04 PSYCHOPHYSIOLOGICAL INSOMNIA: ICD-10-CM

## 2022-11-25 PROCEDURE — G8417 CALC BMI ABV UP PARAM F/U: HCPCS | Performed by: FAMILY MEDICINE

## 2022-11-25 PROCEDURE — 1036F TOBACCO NON-USER: CPT | Performed by: FAMILY MEDICINE

## 2022-11-25 PROCEDURE — 99213 OFFICE O/P EST LOW 20 MIN: CPT | Performed by: FAMILY MEDICINE

## 2022-11-25 PROCEDURE — G8484 FLU IMMUNIZE NO ADMIN: HCPCS | Performed by: FAMILY MEDICINE

## 2022-11-25 PROCEDURE — G8427 DOCREV CUR MEDS BY ELIG CLIN: HCPCS | Performed by: FAMILY MEDICINE

## 2022-11-25 RX ORDER — DOCUSATE SODIUM 100 MG/1
100 CAPSULE, LIQUID FILLED ORAL 2 TIMES DAILY
COMMUNITY

## 2022-11-25 ASSESSMENT — PATIENT HEALTH QUESTIONNAIRE - PHQ9
SUM OF ALL RESPONSES TO PHQ QUESTIONS 1-9: 18
3. TROUBLE FALLING OR STAYING ASLEEP: 3
SUM OF ALL RESPONSES TO PHQ QUESTIONS 1-9: 18
6. FEELING BAD ABOUT YOURSELF - OR THAT YOU ARE A FAILURE OR HAVE LET YOURSELF OR YOUR FAMILY DOWN: 3
2. FEELING DOWN, DEPRESSED OR HOPELESS: 3
1. LITTLE INTEREST OR PLEASURE IN DOING THINGS: 3
8. MOVING OR SPEAKING SO SLOWLY THAT OTHER PEOPLE COULD HAVE NOTICED. OR THE OPPOSITE, BEING SO FIGETY OR RESTLESS THAT YOU HAVE BEEN MOVING AROUND A LOT MORE THAN USUAL: 0
SUM OF ALL RESPONSES TO PHQ QUESTIONS 1-9: 18
SUM OF ALL RESPONSES TO PHQ9 QUESTIONS 1 & 2: 6
SUM OF ALL RESPONSES TO PHQ QUESTIONS 1-9: 18
10. IF YOU CHECKED OFF ANY PROBLEMS, HOW DIFFICULT HAVE THESE PROBLEMS MADE IT FOR YOU TO DO YOUR WORK, TAKE CARE OF THINGS AT HOME, OR GET ALONG WITH OTHER PEOPLE: 2
5. POOR APPETITE OR OVEREATING: 0
9. THOUGHTS THAT YOU WOULD BE BETTER OFF DEAD, OR OF HURTING YOURSELF: 0
4. FEELING TIRED OR HAVING LITTLE ENERGY: 3
7. TROUBLE CONCENTRATING ON THINGS, SUCH AS READING THE NEWSPAPER OR WATCHING TELEVISION: 3

## 2022-11-25 ASSESSMENT — ENCOUNTER SYMPTOMS
VOMITING: 0
NAUSEA: 0
WHEEZING: 0
DIARRHEA: 0
CONSTIPATION: 0
COUGH: 0
SHORTNESS OF BREATH: 0
ABDOMINAL PAIN: 0
BACK PAIN: 0

## 2022-11-25 NOTE — PROGRESS NOTES
22  Laurel aCrson : 1995 Sex: female  Age: 28 y.o. Chief Complaint   Patient presents with    Anxiety     HPI:  28 y.o. female presents today for follow up of anxiety and insomnia. Patient's chart, medical, surgical and medication history all reviewed. Anxiety  Patient complains of evaluation of anxiety disorder and sleep disturbance. She has the following anxiety symptoms: fatigue, feelings of losing control, insomnia, irritable, palpitations, racing thoughts, shakiness . Onset of symptoms was approximately several months (but long standing problem), gradually worsening since that time. She denies current suicidal and homicidal ideation. Previous treatment includes BuSpar, Celexa, Lexapro, Paxil, Wellbutrin, Zoloft, and Pristiq  and  no therapy . She complains of the following side effects from the treatment: weight gain and worsening anxiety . She really did not want to start SSRI due to potential for weight gain. ROS:  Review of Systems   Constitutional:  Negative for chills, fatigue and fever. Respiratory:  Negative for cough, shortness of breath and wheezing. Cardiovascular:  Negative for chest pain and palpitations. Gastrointestinal:  Negative for abdominal pain, constipation, diarrhea, nausea and vomiting. Musculoskeletal:  Negative for arthralgias and back pain. Skin:  Negative for rash. Neurological:  Negative for dizziness and headaches. Psychiatric/Behavioral:  Positive for sleep disturbance. Negative for dysphoric mood. The patient is nervous/anxious. All other systems reviewed and are negative. Current Outpatient Medications on File Prior to Visit   Medication Sig Dispense Refill    docusate sodium (COLACE) 100 MG capsule Take 100 mg by mouth 2 times daily      esomeprazole (NEXIUM) 40 MG delayed release capsule Take 1 capsule by mouth in the morning.  30 capsule 5    TRI-ESTARYLLA 0.18/0.215/0.25 MG-35 MCG TABS TAKE 1 TABLET BY MOUTH EVERY DAY 28 tablet 11    ferrous sulfate (SLOW FE) 142 (45 Fe) MG extended release tablet Take 142 mg by mouth daily      levothyroxine (SYNTHROID) 50 MCG tablet levothyroxine 50 mcg tablet   1/2 tab once per day      vitamin D 25 MCG (1000 UT) CAPS Vitamin D3 25 mcg (1,000 unit) capsule   Take 1 capsule every day by oral route. Ascorbic Acid (VITAMIN C PO) Vitamin C       No current facility-administered medications on file prior to visit. Allergies   Allergen Reactions    Penicillins Anaphylaxis    Cephalosporins     Sulfa Antibiotics        Past Medical History:   Diagnosis Date    Gastroesophageal reflux disease without esophagitis     Iron deficiency anemia due to chronic blood loss 05/04/2016    Exercise-induced, [place] by  [place]     Obesity (BMI 30.0-34. 9)     Reactive depression     Thyroid disease      History reviewed. No pertinent surgical history.   Family History   Problem Relation Age of Onset    Hashimoto Thyroiditis Mother     Rheum Arthritis Mother     Hypertension Mother     Hypertension Father     High Cholesterol Father     Hypothyroidism Maternal Grandmother     Breast Cancer Paternal Grandmother     Lung Cancer Paternal Grandmother     Hyperthyroidism Maternal Aunt     Hypothyroidism Maternal Aunt      Social History     Socioeconomic History    Marital status: Single     Spouse name: Not on file    Number of children: Not on file    Years of education: Not on file    Highest education level: Not on file   Occupational History    Not on file   Tobacco Use    Smoking status: Never    Smokeless tobacco: Never   Substance and Sexual Activity    Alcohol use: No    Drug use: Not on file    Sexual activity: Not on file   Other Topics Concern    Not on file   Social History Narrative    PMH - Denies    PSX - [place] teeth    [address] - Denies except Paternal Grandmother Breast CA [de-identified]        Social: [employer], STARTING 1/2020 [facility] children     Social Determinants of Health Financial Resource Strain: Not on file   Food Insecurity: Not on file   Transportation Needs: Not on file   Physical Activity: Not on file   Stress: Not on file   Social Connections: Not on file   Intimate Partner Violence: Not on file   Housing Stability: Not on file       Vitals:    11/25/22 1516   BP: 122/86   Pulse: 77   Resp: 16   Temp: 98.7 °F (37.1 °C)   TempSrc: Temporal   SpO2: 98%   Weight: 175 lb (79.4 kg)   Height: 5' 3\" (1.6 m)       Physical Exam:  Physical Exam  Vitals and nursing note reviewed. Constitutional:       General: She is not in acute distress. Appearance: Normal appearance. She is well-developed and normal weight. She is not ill-appearing. HENT:      Head: Normocephalic and atraumatic. Right Ear: Hearing and external ear normal.      Left Ear: Hearing and external ear normal.      Nose: Nose normal.      Mouth/Throat:      Mouth: Mucous membranes are moist.   Eyes:      General: Lids are normal. No scleral icterus. Extraocular Movements: Extraocular movements intact. Conjunctiva/sclera: Conjunctivae normal.   Neck:      Thyroid: No thyromegaly. Cardiovascular:      Rate and Rhythm: Normal rate and regular rhythm. Heart sounds: Normal heart sounds. No murmur heard. Pulmonary:      Effort: Pulmonary effort is normal. No respiratory distress. Breath sounds: Normal breath sounds. No wheezing. Musculoskeletal:         General: No tenderness or deformity. Normal range of motion. Cervical back: Normal range of motion and neck supple. Right lower leg: No edema. Left lower leg: No edema. Lymphadenopathy:      Cervical: No cervical adenopathy. Skin:     General: Skin is warm and dry. Findings: No rash. Neurological:      General: No focal deficit present. Mental Status: She is alert and oriented to person, place, and time. Gait: Gait normal.   Psychiatric:         Mood and Affect: Affect normal. Mood is anxious. Speech: Speech normal.         Behavior: Behavior is hyperactive. Thought Content:  Thought content normal.       Labs:  CBC with Differential:    Lab Results   Component Value Date/Time    WBC 6.2 09/23/2022 12:00 AM    RBC 4.58 09/23/2022 12:00 AM    HGB 14.0 09/23/2022 12:00 AM    HCT 42.1 09/23/2022 12:00 AM     09/23/2022 12:00 AM    MCV 91.9 09/23/2022 12:00 AM    MCH 30.6 09/23/2022 12:00 AM    MCHC 33.3 09/23/2022 12:00 AM    RDW 12.5 09/23/2022 12:00 AM    SEGSPCT 82 06/15/2013 09:00 PM    LYMPHOPCT 39.7 09/23/2022 12:00 AM    MONOPCT 6.0 09/23/2022 12:00 AM    EOSPCT 0.3 09/23/2022 12:00 AM    BASOPCT 0.5 09/23/2022 12:00 AM    MONOSABS 372 09/23/2022 12:00 AM    LYMPHSABS 2,461 09/23/2022 12:00 AM    EOSABS 19 09/23/2022 12:00 AM    BASOSABS 31 09/23/2022 12:00 AM     CMP:    Lab Results   Component Value Date/Time     09/23/2022 12:00 AM    K 4.1 09/23/2022 12:00 AM    K 4.0 07/03/2021 03:20 PM     09/23/2022 12:00 AM    CO2 27 09/23/2022 12:00 AM    BUN 16 09/23/2022 12:00 AM    CREATININE 0.88 09/23/2022 12:00 AM    GFRAA >60 07/03/2021 03:20 PM    LABGLOM 92 09/23/2022 12:00 AM    LABGLOM >60 07/03/2021 03:20 PM    GLUCOSE 79 09/23/2022 12:00 AM    PROT 7.2 07/03/2021 03:20 PM    LABALBU 4.1 09/23/2022 12:00 AM    CALCIUM 9.3 09/23/2022 12:00 AM    BILITOT 0.7 09/23/2022 12:00 AM    ALKPHOS 43 09/23/2022 12:00 AM    AST 16 09/23/2022 12:00 AM    ALT 13 09/23/2022 12:00 AM     U/A:    Lab Results   Component Value Date/Time    NITRITE positive 12/15/2021 11:46 AM    COLORU Yellow 09/23/2022 12:00 AM    PROTEINU Negative 09/23/2022 12:00 AM    PHUR 6.5 09/23/2022 12:00 AM    WBCUA 0-1 06/16/2013 12:10 AM    RBCUA 1-3 06/16/2013 12:10 AM    BACTERIA RARE 06/16/2013 12:10 AM    CLARITYU Clear 09/23/2022 12:00 AM    SPECGRAV 1.020 12/15/2021 11:46 AM    SPECGRAV <=1.005 06/16/2013 12:10 AM    LEUKOCYTESUR Negative 09/23/2022 12:00 AM    UROBILINOGEN 0.2 06/16/2013 12:10 AM BILIRUBINUR Negative 09/23/2022 12:00 AM    BLOODU Negative 09/23/2022 12:00 AM    GLUCOSEU NEG 09/23/2022 12:00 AM    AMORPHOUS FEW 06/16/2013 12:10 AM     HgBA1c:    Lab Results   Component Value Date/Time    LABA1C 4.8 09/23/2022 12:00 AM     FLP:    Lab Results   Component Value Date/Time    TRIG 97 09/23/2022 12:00 AM    HDL 86 09/23/2022 12:00 AM    LDLCALC 53 09/23/2022 12:00 AM     TSH:    Lab Results   Component Value Date/Time    TSH 3.45 09/23/2022 12:00 AM          Assessment and Plan:  Mindy Zabala was seen today for anxiety. Diagnoses and all orders for this visit:    Generalized anxiety disorder    Psychophysiological insomnia    Weight gain  -     Cortisol Total; Future  -     Miscellaneous Sendout; Future  Patient with significant anxiety about multiple life stressors. Discussed counseling to help manage. Will also try to get results of GeneSIght testing she had in the past.  Will hold off on starting new medication until those are resulted. Return if symptoms worsen or fail to improve.       Seen By:  DO Abhinav

## 2023-02-05 LAB
BASOPHILS ABSOLUTE: 32 /ΜL
BASOPHILS RELATIVE PERCENT: 0.7 %
EOSINOPHILS ABSOLUTE: 18 /ΜL
EOSINOPHILS RELATIVE PERCENT: 0.4 %
HCT VFR BLD CALC: 45.3 % (ref 36–46)
HEMOGLOBIN: 14.9 G/DL (ref 12–16)
LYMPHOCYTES ABSOLUTE: 2530 /ΜL
LYMPHOCYTES RELATIVE PERCENT: 55 %
MCH RBC QN AUTO: 29.8 PG
MCHC RBC AUTO-ENTMCNC: 32.9 G/DL
MCV RBC AUTO: 90.6 FL
MONOCYTES ABSOLUTE: 221 /ΜL
MONOCYTES RELATIVE PERCENT: 4.8 %
NEUTROPHILS ABSOLUTE: 1799 /ΜL
NEUTROPHILS RELATIVE PERCENT: 39.1 %
PDW BLD-RTO: 12.3 %
PLATELET # BLD: 238 K/ΜL
PMV BLD AUTO: 11.5 FL
RBC # BLD: 5 10^6/ΜL
WBC # BLD: 4.6 10^3/ML

## 2023-02-07 RX ORDER — ESOMEPRAZOLE MAGNESIUM 40 MG/1
CAPSULE, DELAYED RELEASE ORAL
Qty: 30 CAPSULE | Refills: 5 | Status: SHIPPED | OUTPATIENT
Start: 2023-02-07

## 2023-02-09 DIAGNOSIS — R53.83 OTHER FATIGUE: ICD-10-CM

## 2023-02-10 ENCOUNTER — TELEPHONE (OUTPATIENT)
Dept: PRIMARY CARE CLINIC | Age: 28
End: 2023-02-10

## 2023-02-10 NOTE — TELEPHONE ENCOUNTER
She can be scheduled in a RED slot if she would like, but hair loss like this is often due to stress/anxiety. Alopecia would causes patchy bald spots. I typically recommend 5000 mg biotin and scalp massage.   But again, she can come in for a RED visit if she feels she needs to discuss this further

## 2023-02-10 NOTE — TELEPHONE ENCOUNTER
----- Message from [facility] sent at 2/10/2023  8:14 AM EST -----  Subject: Appointment Request    Reason for Call: Established Patient Appointment needed: Routine (Patient   Request) No Script    QUESTIONS    Reason for appointment request? Available appointments did not meet   patient need     Additional Information for Provider? Patient is currently experiencing   some hair loss. She states her hair started to fall out once she started   to work two jobs. She loses a lot of hair in the shower and even when her   hair is dry. Her mom does have a history of Alopecia so she is concerned. She also wanted to inform Dr. Petra Kee that she has an appointment   scheduled with Rheumatology on 03/08. Please call patient to discuss or   schedule appointment as next one does not show available until April . Patient wants to be seen sooner.    ---------------------------------------------------------------------------  --------------  Terell ROMAN  [phone]; OK to leave message on voicemail  ---------------------------------------------------------------------------  --------------  SCRIPT ANSWERS  COVID Screen: Vivian Acosta

## 2023-03-08 ENCOUNTER — OFFICE VISIT (OUTPATIENT)
Dept: RHEUMATOLOGY | Age: 28
End: 2023-03-08
Payer: COMMERCIAL

## 2023-03-08 VITALS — WEIGHT: 170 LBS | HEIGHT: 63 IN | BODY MASS INDEX: 30.12 KG/M2

## 2023-03-08 DIAGNOSIS — E03.9 ACQUIRED HYPOTHYROIDISM: ICD-10-CM

## 2023-03-08 DIAGNOSIS — R76.8 POSITIVE ANA (ANTINUCLEAR ANTIBODY): Primary | ICD-10-CM

## 2023-03-08 PROCEDURE — G8427 DOCREV CUR MEDS BY ELIG CLIN: HCPCS | Performed by: INTERNAL MEDICINE

## 2023-03-08 PROCEDURE — 1036F TOBACCO NON-USER: CPT | Performed by: INTERNAL MEDICINE

## 2023-03-08 PROCEDURE — 99204 OFFICE O/P NEW MOD 45 MIN: CPT | Performed by: INTERNAL MEDICINE

## 2023-03-08 PROCEDURE — G8484 FLU IMMUNIZE NO ADMIN: HCPCS | Performed by: INTERNAL MEDICINE

## 2023-03-08 PROCEDURE — G8417 CALC BMI ABV UP PARAM F/U: HCPCS | Performed by: INTERNAL MEDICINE

## 2023-03-08 ASSESSMENT — ENCOUNTER SYMPTOMS
NAUSEA: 0
SHORTNESS OF BREATH: 1
COLOR CHANGE: 0
VOMITING: 0
TROUBLE SWALLOWING: 0
COUGH: 0
ABDOMINAL PAIN: 0
DIARRHEA: 0

## 2023-03-08 NOTE — PROGRESS NOTES
Laurel Carson 1995 is a 33 y.o. female, here for evaluation of the following chief complaint(s):  New Patient (Patient here as a new patient for positive LOIS)         ASSESSMENT/PLAN:    Laurel Carson 1995 is a 33 y.o. female seen in consult for positive LOIS. 1.  Positive LOIS-she has a titer of 1: 1280 which is a significantly elevated titer. She has some nonspecific symptoms but no striking signs to suggest active systemic connective tissue disease at this point. She does have a strong family history however so will need further work-up as below. Roughly 10 to 15% of the population will have a positive LOIS without an underlying systemic disorder. If work-up is unrevealing she can follow-up with me annually to monitor. If I see anything concerning on work-up below we may have her back for follow-up sooner as appropriate. 2.  Hypothyroidism-this seems to be mild but will check thyroid antibodies as this can be a common cause of positive LOIS. 1. Positive LOIS (antinuclear antibody)  -     C3 Complement; Future  -     C4 Complement; Future  -     CBC with Auto Differential; Future  -     Comprehensive Metabolic Panel; Future  -     Sjogren's Ab (SS-A, SS-B); Future  -     Anti-RNP (VERONA Ab); Future  -     Herrera (VERONA) Antibody IgG; Future  -     Anti-DNA Antibody, Double-Stranded; Future  -     Urinalysis; Future  -     Cardiolipin Ab IgG, IgM, IgA; Future  -     Beta-2 Glycoprotein Antibodies; Future  -     Dilute Jose J Venom Viper Time; Future  -     Thyroid Peroxidase Antibody; Future  -     Thyroid Stimulating Immunoglobulin; Future  -     Thyrotropin receptor antibody; Future  2. Acquired hypothyroidism      Return in about 1 year (around 3/8/2024). Subjective   SUBJECTIVE/OBJECTIVE:    HPI: Laurel Carson 1995 is a 33 y.o. female seen in consult for positive LOIS. Patient has had a known positive LOIS since about 2019.   She states at that time she was having random body aches. She feels like a lot of her issues may be stress related but she will have random episodes of tingling. She states that she did have one 6-month stretch of nerve pain in the legs which has since resolved. She does get some oral ulcers. She has some shortness of breath and palpitations. Cardiac monitor was normal however. She does have some dysuria. Recent LOIS is positive at 1: 1280. She did see an outside rheumatologist back in 2020 and had a negative AVISE. Her mother is a patient of mine and has rheumatoid arthritis and alopecia areata. Past Medical History:   Diagnosis Date    Gastroesophageal reflux disease without esophagitis     Iron deficiency anemia due to chronic blood loss 05/04/2016    Exercise-induced, followed by  in [place]     Obesity (BMI 30.0-34. 9)     Reactive depression     Thyroid disease         Review of Systems   Constitutional:  Negative for fatigue and fever. HENT:  Positive for mouth sores. Negative for trouble swallowing. Respiratory:  Positive for shortness of breath. Negative for cough. Cardiovascular:  Positive for palpitations. Negative for chest pain. Gastrointestinal:  Negative for abdominal pain, diarrhea, nausea and vomiting. Genitourinary:  Negative for dysuria and hematuria. Musculoskeletal:  Positive for myalgias. Negative for arthralgias and joint swelling. Skin:  Negative for color change and rash. Neurological:  Positive for numbness. Negative for weakness. Hematological:  Positive for adenopathy. All other systems reviewed and are negative. Objective   There were no vitals filed for this visit. Physical Exam  Constitutional:       General: She is not in acute distress. Appearance: Normal appearance. HENT:      Head: Normocephalic and atraumatic. Right Ear: External ear normal.      Left Ear: External ear normal.      Nose: Nose normal.   Eyes:      General: No scleral icterus.   Pulmonary:      Effort: Pulmonary effort is normal.   Musculoskeletal:         General: No swelling, tenderness or deformity. Skin:     General: Skin is warm and dry. Findings: No rash. Neurological:      General: No focal deficit present. Mental Status: She is alert and oriented to person, place, and time. Mental status is at baseline. Psychiatric:         Mood and Affect: Mood normal.         Behavior: Behavior normal.          Lab Results   Component Value Date    WBC 4.6 02/04/2023    HGB 14.9 02/04/2023    HCT 45.3 02/04/2023    MCV 90.6 02/04/2023     02/04/2023     Lab Results   Component Value Date     09/23/2022    K 4.1 09/23/2022     09/23/2022    CO2 27 09/23/2022    BUN 16 09/23/2022    CREATININE 0.88 09/23/2022    GLUCOSE 79 09/23/2022    CALCIUM 9.3 09/23/2022    PROT 7.2 07/03/2021    LABALBU 4.1 09/23/2022    BILITOT 0.7 09/23/2022    ALKPHOS 43 09/23/2022    AST 16 09/23/2022    ALT 13 09/23/2022    LABGLOM 92 09/23/2022    GFRAA >60 07/03/2021     No results found for: LOIS  No results found for: RHEUMFACTOR  Lab Results   Component Value Date    SEDRATE <1 09/23/2022     Lab Results   Component Value Date    CRP 6.1 09/23/2022            An electronic signature was used to authenticate this note. This note was generated with a voice recognition dictation system. Please disregard any errors or omission which have escaped my review.     --Marianne Chavez,

## 2023-05-22 ENCOUNTER — TELEPHONE (OUTPATIENT)
Dept: PRIMARY CARE CLINIC | Age: 28
End: 2023-05-22

## 2023-06-16 ENCOUNTER — TELEPHONE (OUTPATIENT)
Dept: FAMILY MEDICINE CLINIC | Age: 28
End: 2023-06-16

## 2023-06-30 ENCOUNTER — OFFICE VISIT (OUTPATIENT)
Dept: FAMILY MEDICINE CLINIC | Age: 28
End: 2023-06-30
Payer: COMMERCIAL

## 2023-06-30 VITALS
BODY MASS INDEX: 30.48 KG/M2 | OXYGEN SATURATION: 99 % | HEART RATE: 92 BPM | TEMPERATURE: 97.2 F | SYSTOLIC BLOOD PRESSURE: 126 MMHG | RESPIRATION RATE: 16 BRPM | DIASTOLIC BLOOD PRESSURE: 80 MMHG | WEIGHT: 172 LBS | HEIGHT: 63 IN

## 2023-06-30 DIAGNOSIS — M25.511 ACUTE PAIN OF RIGHT SHOULDER: Primary | ICD-10-CM

## 2023-06-30 DIAGNOSIS — E03.9 ACQUIRED HYPOTHYROIDISM: ICD-10-CM

## 2023-06-30 DIAGNOSIS — F41.1 GENERALIZED ANXIETY DISORDER: ICD-10-CM

## 2023-06-30 PROCEDURE — 99213 OFFICE O/P EST LOW 20 MIN: CPT | Performed by: FAMILY MEDICINE

## 2023-06-30 PROCEDURE — G8427 DOCREV CUR MEDS BY ELIG CLIN: HCPCS | Performed by: FAMILY MEDICINE

## 2023-06-30 PROCEDURE — 1036F TOBACCO NON-USER: CPT | Performed by: FAMILY MEDICINE

## 2023-06-30 PROCEDURE — G8417 CALC BMI ABV UP PARAM F/U: HCPCS | Performed by: FAMILY MEDICINE

## 2023-06-30 RX ORDER — PREDNISONE 10 MG/1
TABLET ORAL
Qty: 30 TABLET | Refills: 0 | Status: SHIPPED | OUTPATIENT
Start: 2023-06-30 | End: 2023-07-12

## 2023-06-30 ASSESSMENT — PATIENT HEALTH QUESTIONNAIRE - PHQ9
7. TROUBLE CONCENTRATING ON THINGS, SUCH AS READING THE NEWSPAPER OR WATCHING TELEVISION: 0
5. POOR APPETITE OR OVEREATING: 0
SUM OF ALL RESPONSES TO PHQ QUESTIONS 1-9: 1
SUM OF ALL RESPONSES TO PHQ9 QUESTIONS 1 & 2: 0
SUM OF ALL RESPONSES TO PHQ QUESTIONS 1-9: 1
9. THOUGHTS THAT YOU WOULD BE BETTER OFF DEAD, OR OF HURTING YOURSELF: 0
3. TROUBLE FALLING OR STAYING ASLEEP: 0
2. FEELING DOWN, DEPRESSED OR HOPELESS: 0
SUM OF ALL RESPONSES TO PHQ QUESTIONS 1-9: 1
10. IF YOU CHECKED OFF ANY PROBLEMS, HOW DIFFICULT HAVE THESE PROBLEMS MADE IT FOR YOU TO DO YOUR WORK, TAKE CARE OF THINGS AT HOME, OR GET ALONG WITH OTHER PEOPLE: 0
1. LITTLE INTEREST OR PLEASURE IN DOING THINGS: 0
SUM OF ALL RESPONSES TO PHQ QUESTIONS 1-9: 1
4. FEELING TIRED OR HAVING LITTLE ENERGY: 1
8. MOVING OR SPEAKING SO SLOWLY THAT OTHER PEOPLE COULD HAVE NOTICED. OR THE OPPOSITE, BEING SO FIGETY OR RESTLESS THAT YOU HAVE BEEN MOVING AROUND A LOT MORE THAN USUAL: 0
6. FEELING BAD ABOUT YOURSELF - OR THAT YOU ARE A FAILURE OR HAVE LET YOURSELF OR YOUR FAMILY DOWN: 0

## 2023-06-30 ASSESSMENT — ENCOUNTER SYMPTOMS
DIARRHEA: 0
WHEEZING: 0
CONSTIPATION: 0
BACK PAIN: 0
COUGH: 0
NAUSEA: 0
SHORTNESS OF BREATH: 0
ABDOMINAL PAIN: 0
VOMITING: 0

## 2023-07-05 ENCOUNTER — TELEPHONE (OUTPATIENT)
Dept: FAMILY MEDICINE CLINIC | Age: 28
End: 2023-07-05

## 2023-07-10 ENCOUNTER — TELEPHONE (OUTPATIENT)
Dept: FAMILY MEDICINE CLINIC | Age: 28
End: 2023-07-10

## 2023-07-10 DIAGNOSIS — M25.511 ACUTE PAIN OF RIGHT SHOULDER: Primary | ICD-10-CM

## 2023-07-10 DIAGNOSIS — M54.12 CERVICAL RADICULOPATHY: ICD-10-CM

## 2023-07-10 NOTE — TELEPHONE ENCOUNTER
Unfortunately, we just got notification that her MRI of the shoulder was denied because she hasn't completed physical therapy.   This could be directed at her shoulder and neck

## 2023-07-10 NOTE — TELEPHONE ENCOUNTER
Patient called to see if she should still move forward w/ MRI of shoulder or if she needs a work up of her neck. States that pain is at the base of her neck and goes into the trapezius and scapula. MRI shoulder is pending insurance review w/ [facility].

## 2023-07-11 NOTE — TELEPHONE ENCOUNTER
Pt is aware, and she stated she would be interested in PT. She suggested [facility] in [place] if possible.

## 2023-08-16 RX ORDER — ESOMEPRAZOLE MAGNESIUM 40 MG/1
CAPSULE, DELAYED RELEASE ORAL
Qty: 30 CAPSULE | Refills: 5 | Status: SHIPPED | OUTPATIENT
Start: 2023-08-16

## 2023-08-22 ENCOUNTER — TELEPHONE (OUTPATIENT)
Dept: PRIMARY CARE CLINIC | Age: 28
End: 2023-08-22

## 2023-08-22 NOTE — TELEPHONE ENCOUNTER
The pt is calling because about 5 days ago she started having some acid reflux but now its in the abdomen area, she gets distended, and has what she describes as spasms after a bowel movement, she says it feels like she may have a lot of gas but its not going away

## 2023-08-22 NOTE — TELEPHONE ENCOUNTER
Pt is taking Nexium 40 mg and Pepcid 40 mg at night before bed. Pt is taking gaviscon as needed after meals. Pt states that it feel like after a BM she feels pressure and her recent Bms have been hard.

## 2023-08-22 NOTE — TELEPHONE ENCOUNTER
She needs acute imaging of her abdomen to assess for blockage.   I'm happy to order an acute abdominal series, but some of the testing may be quicker in theER

## 2023-08-24 ENCOUNTER — TELEPHONE (OUTPATIENT)
Dept: FAMILY MEDICINE CLINIC | Age: 28
End: 2023-08-24

## 2023-08-24 NOTE — TELEPHONE ENCOUNTER
----- Message from Jethro Zuleymaagatha sent at 8/24/2023  9:51 AM EDT -----  Subject: Message to Provider    QUESTIONS  Information for Provider? Patient has seen a MADALYN Lu in [place].  ---------------------------------------------------------------------------  --------------  [address]  [phone]; OK to leave message on voicemail  ---------------------------------------------------------------------------  --------------  SCRIPT ANSWERS  Relationship to Patient?  Self

## 2023-08-24 NOTE — TELEPHONE ENCOUNTER
Patient wanted you to know that she did not go to the ER b/c she did not have a fever. She was able to get an appointment w/ Mariama Cr. Elda Martinez saw her and will be doing an endoscopy on Thursday to rule out gastritis or an ulcer. Patient is taking 40mg of Nexium in the AM and 30-40mg of Pepcid at bedtime.

## 2023-11-03 ENCOUNTER — OFFICE VISIT (OUTPATIENT)
Dept: FAMILY MEDICINE CLINIC | Age: 28
End: 2023-11-03
Payer: COMMERCIAL

## 2023-11-03 VITALS
DIASTOLIC BLOOD PRESSURE: 84 MMHG | BODY MASS INDEX: 29.77 KG/M2 | WEIGHT: 168 LBS | HEART RATE: 78 BPM | OXYGEN SATURATION: 98 % | TEMPERATURE: 98.5 F | RESPIRATION RATE: 16 BRPM | HEIGHT: 63 IN | SYSTOLIC BLOOD PRESSURE: 128 MMHG

## 2023-11-03 DIAGNOSIS — R73.01 IMPAIRED FASTING GLUCOSE: ICD-10-CM

## 2023-11-03 DIAGNOSIS — Z00.00 ENCOUNTER FOR WELL ADULT EXAM WITHOUT ABNORMAL FINDINGS: Primary | ICD-10-CM

## 2023-11-03 DIAGNOSIS — Z13.6 SCREENING FOR CARDIOVASCULAR CONDITION: ICD-10-CM

## 2023-11-03 DIAGNOSIS — K21.9 GASTROESOPHAGEAL REFLUX DISEASE WITHOUT ESOPHAGITIS: ICD-10-CM

## 2023-11-03 DIAGNOSIS — E55.9 VITAMIN D INSUFFICIENCY: ICD-10-CM

## 2023-11-03 DIAGNOSIS — E03.9 ACQUIRED HYPOTHYROIDISM: ICD-10-CM

## 2023-11-03 PROCEDURE — 99395 PREV VISIT EST AGE 18-39: CPT | Performed by: FAMILY MEDICINE

## 2023-11-03 PROCEDURE — G8484 FLU IMMUNIZE NO ADMIN: HCPCS | Performed by: FAMILY MEDICINE

## 2023-11-03 RX ORDER — ESOMEPRAZOLE MAGNESIUM 40 MG/1
CAPSULE, DELAYED RELEASE ORAL
Qty: 90 CAPSULE | Refills: 1 | Status: SHIPPED | OUTPATIENT
Start: 2023-11-03

## 2023-11-03 RX ORDER — LEVOTHYROXINE SODIUM 0.05 MG/1
25 TABLET ORAL DAILY
Qty: 45 TABLET | Refills: 3 | Status: SHIPPED | OUTPATIENT
Start: 2023-11-03 | End: 2024-02-01

## 2023-11-03 SDOH — ECONOMIC STABILITY: HOUSING INSECURITY
IN THE LAST 12 MONTHS, WAS THERE A TIME WHEN YOU DID NOT HAVE A STEADY PLACE TO SLEEP OR SLEPT IN A SHELTER (INCLUDING NOW)?: NO

## 2023-11-03 SDOH — ECONOMIC STABILITY: FOOD INSECURITY: WITHIN THE PAST 12 MONTHS, YOU WORRIED THAT YOUR FOOD WOULD RUN OUT BEFORE YOU GOT MONEY TO BUY MORE.: NEVER TRUE

## 2023-11-03 SDOH — ECONOMIC STABILITY: FOOD INSECURITY: WITHIN THE PAST 12 MONTHS, THE FOOD YOU BOUGHT JUST DIDN'T LAST AND YOU DIDN'T HAVE MONEY TO GET MORE.: NEVER TRUE

## 2023-11-03 SDOH — ECONOMIC STABILITY: INCOME INSECURITY: HOW HARD IS IT FOR YOU TO PAY FOR THE VERY BASICS LIKE FOOD, HOUSING, MEDICAL CARE, AND HEATING?: NOT HARD AT ALL

## 2023-11-18 PROBLEM — K21.9 GASTROESOPHAGEAL REFLUX DISEASE WITHOUT ESOPHAGITIS: Status: ACTIVE | Noted: 2023-11-18

## 2023-11-18 ASSESSMENT — ENCOUNTER SYMPTOMS
SHORTNESS OF BREATH: 0
CONSTIPATION: 0
DIARRHEA: 0
WHEEZING: 0
NAUSEA: 0
VOMITING: 0
COUGH: 0
ABDOMINAL PAIN: 0
BACK PAIN: 0

## 2023-11-18 NOTE — PROGRESS NOTES
Right lower leg: No edema. Left lower leg: No edema. Lymphadenopathy:      Cervical: No cervical adenopathy. Skin:     General: Skin is warm and dry. Findings: No rash. Neurological:      General: No focal deficit present. Mental Status: She is alert and oriented to person, place, and time. Gait: Gait normal.   Psychiatric:         Mood and Affect: Mood and affect normal.         Speech: Speech normal.         Behavior: Behavior normal.         Thought Content:  Thought content normal.         Labs:  CBC with Differential:    Lab Results   Component Value Date/Time    WBC 4.6 02/04/2023 12:00 AM    RBC 5.00 02/04/2023 12:00 AM    HGB 14.9 02/04/2023 12:00 AM    HCT 45.3 02/04/2023 12:00 AM     02/04/2023 12:00 AM    MCV 90.6 02/04/2023 12:00 AM    MCH 29.8 02/04/2023 12:00 AM    MCHC 32.9 02/04/2023 12:00 AM    RDW 12.3 02/04/2023 12:00 AM    SEGSPCT 82 06/15/2013 09:00 PM    LYMPHOPCT 55.0 02/04/2023 12:00 AM    MONOPCT 4.8 02/04/2023 12:00 AM    EOSPCT 0.4 02/04/2023 12:00 AM    BASOPCT 0.7 02/04/2023 12:00 AM    MONOSABS 221 02/04/2023 12:00 AM    LYMPHSABS 2,530 02/04/2023 12:00 AM    EOSABS 18 02/04/2023 12:00 AM    BASOSABS 32 02/04/2023 12:00 AM     CMP:    Lab Results   Component Value Date/Time     09/23/2022 12:00 AM    K 4.1 09/23/2022 12:00 AM    K 4.0 07/03/2021 03:20 PM     09/23/2022 12:00 AM    CO2 27 09/23/2022 12:00 AM    BUN 16 09/23/2022 12:00 AM    CREATININE 0.88 09/23/2022 12:00 AM    GFRAA >60 07/03/2021 03:20 PM    LABGLOM 92 09/23/2022 12:00 AM    LABGLOM >60 07/03/2021 03:20 PM    GLUCOSE 79 09/23/2022 12:00 AM    PROT 7.2 07/03/2021 03:20 PM    LABALBU 4.1 09/23/2022 12:00 AM    CALCIUM 9.3 09/23/2022 12:00 AM    BILITOT 0.7 09/23/2022 12:00 AM    ALKPHOS 43 09/23/2022 12:00 AM    AST 16 09/23/2022 12:00 AM    ALT 13 09/23/2022 12:00 AM     U/A:    Lab Results   Component Value Date/Time    NITRITE positive 12/15/2021 11:46 AM    COLORU Yellow

## 2023-11-29 ENCOUNTER — TELEPHONE (OUTPATIENT)
Dept: FAMILY MEDICINE CLINIC | Age: 28
End: 2023-11-29

## 2023-11-29 NOTE — TELEPHONE ENCOUNTER
Laurel came into the office to  lab orders. She was wondering why she needed an A1C? And if you want her to get it done or is she okay to not have that done?

## 2023-12-06 LAB
ALBUMIN SERPL-MCNC: 3.9 G/DL
ALP BLD-CCNC: 43 U/L
ALT SERPL-CCNC: 3.9 U/L
ANION GAP SERPL CALCULATED.3IONS-SCNC: NORMAL MMOL/L
AST SERPL-CCNC: 18 U/L
BASOPHILS ABSOLUTE: 29 /ΜL
BASOPHILS RELATIVE PERCENT: 0.7 %
BILIRUB SERPL-MCNC: 0.5 MG/DL (ref 0.1–1.4)
BILIRUBIN, URINE: NEGATIVE
BLOOD, URINE: NEGATIVE
BUN BLDV-MCNC: 19 MG/DL
CALCIUM SERPL-MCNC: 9 MG/DL
CHLORIDE BLD-SCNC: 103 MMOL/L
CHOLESTEROL, TOTAL: 117 MG/DL
CHOLESTEROL/HDL RATIO: 1.9
CLARITY: CLEAR
CO2: 25 MMOL/L
COLOR: YELLOW
CREAT SERPL-MCNC: 1.09 MG/DL
EGFR: 71
EOSINOPHILS ABSOLUTE: 42 /ΜL
EOSINOPHILS RELATIVE PERCENT: 1 %
GLUCOSE BLD-MCNC: 83 MG/DL
GLUCOSE URINE: NORMAL
HCT VFR BLD CALC: 41.3 % (ref 36–46)
HDLC SERPL-MCNC: 63 MG/DL (ref 35–70)
HEMOGLOBIN: 13.4 G/DL (ref 12–16)
KETONES, URINE: NEGATIVE
LDL CHOLESTEROL CALCULATED: 39 MG/DL (ref 0–160)
LEUKOCYTE ESTERASE, URINE: NEGATIVE
LYMPHOCYTES ABSOLUTE: 2381 /ΜL
LYMPHOCYTES RELATIVE PERCENT: 56.7 %
MCH RBC QN AUTO: 30.1 PG
MCHC RBC AUTO-ENTMCNC: 32.4 G/DL
MCV RBC AUTO: 92.8 FL
MONOCYTES ABSOLUTE: 252 /ΜL
MONOCYTES RELATIVE PERCENT: 6 %
NEUTROPHILS ABSOLUTE: 1495 /ΜL
NEUTROPHILS RELATIVE PERCENT: 35.6 %
NITRITE, URINE: NEGATIVE
NONHDLC SERPL-MCNC: NORMAL MG/DL
PDW BLD-RTO: 12.1 %
PH UA: 7.5 (ref 4.5–8)
PLATELET # BLD: 197 K/ΜL
PMV BLD AUTO: 11.2 FL
POTASSIUM SERPL-SCNC: 4.2 MMOL/L
PROTEIN UA: NEGATIVE
RBC # BLD: 4.45 10^6/ΜL
SODIUM BLD-SCNC: 139 MMOL/L
SPECIFIC GRAVITY, URINE: 1.01
TOTAL PROTEIN: 6.1
TRIGL SERPL-MCNC: 73 MG/DL
TSH SERPL DL<=0.05 MIU/L-ACNC: 3.9 UIU/ML
UROBILINOGEN, URINE: NORMAL
VLDLC SERPL CALC-MCNC: NORMAL MG/DL
WBC # BLD: 4.2 10^3/ML

## 2023-12-07 DIAGNOSIS — E55.9 VITAMIN D INSUFFICIENCY: ICD-10-CM

## 2023-12-07 DIAGNOSIS — E03.9 ACQUIRED HYPOTHYROIDISM: ICD-10-CM

## 2023-12-07 DIAGNOSIS — Z13.6 SCREENING FOR CARDIOVASCULAR CONDITION: ICD-10-CM

## 2023-12-07 DIAGNOSIS — R73.01 IMPAIRED FASTING GLUCOSE: ICD-10-CM

## 2023-12-07 DIAGNOSIS — Z00.00 ENCOUNTER FOR WELL ADULT EXAM WITHOUT ABNORMAL FINDINGS: ICD-10-CM

## 2023-12-07 LAB
T4 FREE: 1.1
VITAMIN D 25-HYDROXY: 61
VITAMIN D2, 25 HYDROXY: NORMAL
VITAMIN D3,25 HYDROXY: NORMAL

## 2023-12-08 ENCOUNTER — TELEPHONE (OUTPATIENT)
Dept: FAMILY MEDICINE CLINIC | Age: 28
End: 2023-12-08

## 2023-12-08 NOTE — TELEPHONE ENCOUNTER
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 She noticed her renal function was a little low, so she is wondering if you would want to repeat a CMP or BMP in a couple of weeks? Also, she noticed her TSH is up to 3.9, so she is wanting to know if you want to bump up her Synthroid dosage? Her white blood cell count is also lower, so she is asking if that is OK or if you are concerned about that? She has a full auto immune panel that she is planning on doing next week. She said she would not be opposed to scheduling an appointment with you, because she has some other things she was wanting to discuss, as well as wanting to get an EKG. Please advise.

## 2023-12-08 NOTE — TELEPHONE ENCOUNTER
I don't have availability until the New Year with the upcoming holidays, but if she wants to schedule in the New Year, she is welcome to. I did not see anything wrong with her labs. Minimal fluctuations could have been based on outside factors like hydration, stress, etc.   If she wants to take the full 50 mcg of the levothyroxine, she can.

## 2023-12-08 NOTE — TELEPHONE ENCOUNTER
Reviewed Dr Nunu Kee response w/ Laurel. She would like to discuss concerns at appt. Virtual appt made for 1/11/24.

## 2023-12-11 ENCOUNTER — TELEPHONE (OUTPATIENT)
Dept: FAMILY MEDICINE CLINIC | Age: 28
End: 2023-12-11

## 2023-12-11 DIAGNOSIS — Z00.00 ENCOUNTER FOR WELL ADULT EXAM WITHOUT ABNORMAL FINDINGS: Primary | ICD-10-CM

## 2023-12-11 NOTE — TELEPHONE ENCOUNTER
Patient requesting to repeat a CMP or BMP and CBC. Patient states her kidney function was \"off\" with her last labs on 12/6. CMP on 12/6 appears to be WNL. Please advise.

## 2023-12-11 NOTE — TELEPHONE ENCOUNTER
Patient informed. Patient requested orders faxed to [facility] on [address]. Patient stated she will have labs drawn next week.   Orders faxed to [facility] fax# [phone]

## 2024-01-11 ENCOUNTER — OFFICE VISIT (OUTPATIENT)
Dept: PRIMARY CARE CLINIC | Age: 29
End: 2024-01-11
Payer: COMMERCIAL

## 2024-01-11 VITALS
DIASTOLIC BLOOD PRESSURE: 60 MMHG | TEMPERATURE: 98 F | HEART RATE: 78 BPM | OXYGEN SATURATION: 98 % | BODY MASS INDEX: 26.05 KG/M2 | HEIGHT: 63 IN | SYSTOLIC BLOOD PRESSURE: 126 MMHG | WEIGHT: 147 LBS

## 2024-01-11 DIAGNOSIS — F41.1 GENERALIZED ANXIETY DISORDER: ICD-10-CM

## 2024-01-11 DIAGNOSIS — E03.9 ACQUIRED HYPOTHYROIDISM: Primary | ICD-10-CM

## 2024-01-11 DIAGNOSIS — K59.01 SLOW TRANSIT CONSTIPATION: ICD-10-CM

## 2024-01-11 PROCEDURE — G8427 DOCREV CUR MEDS BY ELIG CLIN: HCPCS | Performed by: FAMILY MEDICINE

## 2024-01-11 PROCEDURE — G8484 FLU IMMUNIZE NO ADMIN: HCPCS | Performed by: FAMILY MEDICINE

## 2024-01-11 PROCEDURE — 99214 OFFICE O/P EST MOD 30 MIN: CPT | Performed by: FAMILY MEDICINE

## 2024-01-11 PROCEDURE — G8417 CALC BMI ABV UP PARAM F/U: HCPCS | Performed by: FAMILY MEDICINE

## 2024-01-11 PROCEDURE — 1036F TOBACCO NON-USER: CPT | Performed by: FAMILY MEDICINE

## 2024-01-11 NOTE — PROGRESS NOTES
24  Laurel Carson : 1995 Sex: female  Age: 29 y.o.    Chief Complaint   Patient presents with    Discuss Labs     Pt here to discuss labs.    Constipation     Started mid December. Pt states that she did start a new high stress job.     HPI:  29 y.o. female presents today for follow up of chronic medical conditions.  Patient's chart, medical, surgical and medication history all reviewed.    Hypothyroidism  Patient presents for routine follow up of Hypothyroidism. Current symptoms: change in energy level and weight changes. Patient denies diarrhea, heat / cold intolerance, nervousness, and palpitations. Symptoms have been well-controlled. No difficulty swallowing or masses felt.  Last TSH was 3.90.  Patient is currently taking levothyroxine 25 mcg.    Anxiety  Patient complains of evaluation of anxiety disorder and sleep disturbance.  She has the following anxiety symptoms: fatigue, feelings of losing control, insomnia, irritable, palpitations, racing thoughts, shakiness . Onset of symptoms was approximately several years, gradually worsening since that time. She denies current suicidal and homicidal ideation.   Previous treatment includes BuSpar, Celexa, Lexapro, Paxil, Wellbutrin, Zoloft, and Pristiq  and  no therapy .  She complains of the following side effects from the treatment: weight gain and worsening anxiety .  She really did not want to start SSRI due to potential for weight gain.      Constipation  Patient complains of constipation.  Co-Morbid conditions:endocrine disease and stress. Symptoms have been symptoms have progressed to a point and plateaued. Current Health Habits: Eating fiber? yes Exercise?yes Water intake? no change     ROS:  Review of Systems   Constitutional:  Negative for chills, fatigue and fever.   Respiratory:  Negative for cough, shortness of breath and wheezing.    Cardiovascular:  Negative for chest pain and palpitations.   Gastrointestinal:  Positive for

## 2024-03-14 LAB
BASOPHILS ABSOLUTE: 29 /ΜL
BASOPHILS RELATIVE PERCENT: 0.7 %
BUN BLDV-MCNC: 20 MG/DL
CALCIUM SERPL-MCNC: 9.3 MG/DL
CHLORIDE BLD-SCNC: 107 MMOL/L
CO2: 27 MMOL/L
CREAT SERPL-MCNC: 0.87 MG/DL
EGFR: 92
EOSINOPHILS ABSOLUTE: 50 /ΜL
EOSINOPHILS RELATIVE PERCENT: 1.2 %
GLUCOSE BLD-MCNC: 61 MG/DL
HCT VFR BLD CALC: 43.5 % (ref 36–46)
HEMOGLOBIN: 14.3 G/DL (ref 12–16)
LYMPHOCYTES ABSOLUTE: 2016 /ΜL
LYMPHOCYTES RELATIVE PERCENT: 48 %
MCH RBC QN AUTO: 30.8 PG
MCHC RBC AUTO-ENTMCNC: 32.9 G/DL
MCV RBC AUTO: 93.5 FL
MONOCYTES ABSOLUTE: 239 /ΜL
MONOCYTES RELATIVE PERCENT: 5.7 %
NEUTROPHILS ABSOLUTE: 1865 /ΜL
NEUTROPHILS RELATIVE PERCENT: 44.4 %
PDW BLD-RTO: 12.4 %
PLATELET # BLD: 246 K/ΜL
PMV BLD AUTO: 11.5 FL
POTASSIUM SERPL-SCNC: 4.4 MMOL/L
RBC # BLD: 4.65 10^6/ΜL
SODIUM BLD-SCNC: 141 MMOL/L
T4 FREE: 1.2
TSH SERPL DL<=0.05 MIU/L-ACNC: 2 UIU/ML
WBC # BLD: 4.2 10^3/ML

## 2024-03-18 DIAGNOSIS — E03.9 ACQUIRED HYPOTHYROIDISM: ICD-10-CM

## 2024-03-28 ENCOUNTER — TELEPHONE (OUTPATIENT)
Dept: PRIMARY CARE CLINIC | Age: 29
End: 2024-03-28

## 2024-03-28 NOTE — TELEPHONE ENCOUNTER
I do feel the bleeding is due to a fissure and the nifedipine is appropriate.  A colonoscopy will not flare up any IBS, but if she wants to wait until after her wedding, that is more than appropriate.

## 2024-03-28 NOTE — TELEPHONE ENCOUNTER
Patient noticed some blood in her stool and on the tissue after wiping. 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 Diagnosed with anal fissure and given Nifedipine.  She also mentioned to Dr. Richard that she has been having generalized lower abdominal pain after bowel movements since Friday and some abdominal bloating. Dr. Richard recommended that patient have a colonoscopy. Patient concerned that she is only 29 and not sure if she needs one. Has heard that it might irritate her even more if she has IBS.     Lower abdominal pain after bowel movements. Dr. Joy, might irritate her even more if there is IBS. Dr. Richard didn't seem to be super concerned. Has a lot of stress at work, which is causing the constipation. Mentioned Bentyl, but said could cause constipation and she doesn't want to make it any worse. Looking for advice.  Getting  in less than 2 months.

## 2024-05-09 ENCOUNTER — PATIENT MESSAGE (OUTPATIENT)
Dept: PRIMARY CARE CLINIC | Age: 29
End: 2024-05-09

## 2024-05-09 RX ORDER — HYDROXYZINE HYDROCHLORIDE 25 MG/1
25 TABLET, FILM COATED ORAL EVERY 8 HOURS PRN
Qty: 30 TABLET | Refills: 5 | Status: SHIPPED | OUTPATIENT
Start: 2024-05-09

## 2024-05-09 NOTE — TELEPHONE ENCOUNTER
From: Laurel Carson  To: Dr. Ana Maria Amezcua  Sent: 5/9/2024 3:33 PM EDT  Subject: Anxiety    Hi Dr. Amezcua,   Would I be able to have a prescription for Hydroxyzine 25 mg for at bedtime for sleep and anxiety? This has helped me in the past and maybe it will help again. I appreciate your understanding.  Laurel

## 2024-05-17 ENCOUNTER — APPOINTMENT (OUTPATIENT)
Dept: CT IMAGING | Age: 29
End: 2024-05-17
Payer: COMMERCIAL

## 2024-05-17 ENCOUNTER — HOSPITAL ENCOUNTER (OUTPATIENT)
Dept: ULTRASOUND IMAGING | Age: 29
End: 2024-05-17
Payer: COMMERCIAL

## 2024-05-17 ENCOUNTER — HOSPITAL ENCOUNTER (OUTPATIENT)
Age: 29
Discharge: HOME | End: 2024-05-17
Payer: COMMERCIAL

## 2024-05-17 ENCOUNTER — HOSPITAL ENCOUNTER (EMERGENCY)
Age: 29
Discharge: HOME | End: 2024-05-17
Attending: STUDENT IN AN ORGANIZED HEALTH CARE EDUCATION/TRAINING PROGRAM
Payer: COMMERCIAL

## 2024-05-17 VITALS
OXYGEN SATURATION: 96 % | DIASTOLIC BLOOD PRESSURE: 90 MMHG | SYSTOLIC BLOOD PRESSURE: 143 MMHG | HEART RATE: 60 BPM | RESPIRATION RATE: 16 BRPM | TEMPERATURE: 98 F

## 2024-05-17 DIAGNOSIS — R10.9 ABDOMINAL PAIN, UNSPECIFIED ABDOMINAL LOCATION: Primary | ICD-10-CM

## 2024-05-17 DIAGNOSIS — K59.00 CONSTIPATION, UNSPECIFIED CONSTIPATION TYPE: ICD-10-CM

## 2024-05-17 DIAGNOSIS — R10.2 PELVIC PAIN: ICD-10-CM

## 2024-05-17 LAB
ALBUMIN SERPL-MCNC: 4.2 G/DL (ref 3.5–5.2)
ALP SERPL-CCNC: 52 U/L (ref 35–104)
ALT SERPL-CCNC: 13 U/L (ref 0–32)
ANION GAP SERPL CALCULATED.3IONS-SCNC: 10 MMOL/L (ref 7–16)
AST SERPL-CCNC: 20 U/L (ref 0–31)
BASOPHILS # BLD: 0.04 K/UL (ref 0–0.2)
BASOPHILS NFR BLD: 1 % (ref 0–2)
BILIRUB SERPL-MCNC: 0.7 MG/DL (ref 0–1.2)
BILIRUB UR QL STRIP: NEGATIVE
BUN SERPL-MCNC: 19 MG/DL (ref 6–20)
CALCIUM SERPL-MCNC: 9.5 MG/DL (ref 8.6–10.2)
CHLORIDE SERPL-SCNC: 102 MMOL/L (ref 98–107)
CLARITY UR: CLEAR
CO2 SERPL-SCNC: 28 MMOL/L (ref 22–29)
COLOR UR: YELLOW
CREAT SERPL-MCNC: 0.9 MG/DL (ref 0.5–1)
EOSINOPHIL # BLD: 0.06 K/UL (ref 0.05–0.5)
EOSINOPHILS RELATIVE PERCENT: 1 % (ref 0–6)
EPI CELLS #/AREA URNS HPF: NORMAL /HPF
ERYTHROCYTE [DISTWIDTH] IN BLOOD BY AUTOMATED COUNT: 12.1 % (ref 11.5–15)
GFR, ESTIMATED: 84 ML/MIN/1.73M2
GLUCOSE SERPL-MCNC: 95 MG/DL (ref 74–99)
GLUCOSE UR STRIP-MCNC: NEGATIVE MG/DL
HCG UR QL: NEGATIVE
HCT VFR BLD AUTO: 43.7 % (ref 34–48)
HGB BLD-MCNC: 14.9 G/DL (ref 11.5–15.5)
HGB UR QL STRIP.AUTO: NEGATIVE
IMM GRANULOCYTES # BLD AUTO: <0.03 K/UL (ref 0–0.58)
IMM GRANULOCYTES NFR BLD: 0 % (ref 0–5)
KETONES UR STRIP-MCNC: NEGATIVE MG/DL
LACTATE BLDV-SCNC: 0.7 MMOL/L (ref 0.5–2.2)
LEUKOCYTE ESTERASE UR QL STRIP: NEGATIVE
LIPASE SERPL-CCNC: 31 U/L (ref 13–60)
LYMPHOCYTES NFR BLD: 2.74 K/UL (ref 1.5–4)
LYMPHOCYTES RELATIVE PERCENT: 51 % (ref 20–42)
MCH RBC QN AUTO: 31 PG (ref 26–35)
MCHC RBC AUTO-ENTMCNC: 34.1 G/DL (ref 32–34.5)
MCV RBC AUTO: 91 FL (ref 80–99.9)
MONOCYTES NFR BLD: 0.29 K/UL (ref 0.1–0.95)
MONOCYTES NFR BLD: 5 % (ref 2–12)
NEUTROPHILS NFR BLD: 41 % (ref 43–80)
NEUTS SEG NFR BLD: 2.2 K/UL (ref 1.8–7.3)
NITRITE UR QL STRIP: NEGATIVE
PH UR STRIP: 7 [PH] (ref 5–9)
PLATELET # BLD AUTO: 228 K/UL (ref 130–450)
PMV BLD AUTO: 10.9 FL (ref 7–12)
POTASSIUM SERPL-SCNC: 4.1 MMOL/L (ref 3.5–5)
PROT SERPL-MCNC: 6.8 G/DL (ref 6.4–8.3)
PROT UR STRIP-MCNC: NEGATIVE MG/DL
RBC # BLD AUTO: 4.8 M/UL (ref 3.5–5.5)
RBC #/AREA URNS HPF: NORMAL /HPF
SODIUM SERPL-SCNC: 140 MMOL/L (ref 132–146)
SP GR UR STRIP: 1.01 (ref 1–1.03)
UROBILINOGEN UR STRIP-ACNC: 0.2 EU/DL (ref 0–1)
WBC #/AREA URNS HPF: NORMAL /HPF
WBC OTHER # BLD: 5.3 K/UL (ref 4.5–11.5)

## 2024-05-17 PROCEDURE — 83690 ASSAY OF LIPASE: CPT

## 2024-05-17 PROCEDURE — 93975 VASCULAR STUDY: CPT

## 2024-05-17 PROCEDURE — 99285 EMERGENCY DEPT VISIT HI MDM: CPT

## 2024-05-17 PROCEDURE — 85025 COMPLETE CBC W/AUTO DIFF WBC: CPT

## 2024-05-17 PROCEDURE — 74177 CT ABD & PELVIS W/CONTRAST: CPT

## 2024-05-17 PROCEDURE — 83605 ASSAY OF LACTIC ACID: CPT

## 2024-05-17 PROCEDURE — 84703 CHORIONIC GONADOTROPIN ASSAY: CPT

## 2024-05-17 PROCEDURE — 6360000002 HC RX W HCPCS: Performed by: STUDENT IN AN ORGANIZED HEALTH CARE EDUCATION/TRAINING PROGRAM

## 2024-05-17 PROCEDURE — 80053 COMPREHEN METABOLIC PANEL: CPT

## 2024-05-17 PROCEDURE — 76830 TRANSVAGINAL US NON-OB: CPT

## 2024-05-17 PROCEDURE — 96374 THER/PROPH/DIAG INJ IV PUSH: CPT

## 2024-05-17 PROCEDURE — 81001 URINALYSIS AUTO W/SCOPE: CPT

## 2024-05-17 PROCEDURE — 6360000004 HC RX CONTRAST MEDICATION: Performed by: RADIOLOGY

## 2024-05-17 RX ORDER — KETOROLAC TROMETHAMINE 30 MG/ML
15 INJECTION, SOLUTION INTRAMUSCULAR; INTRAVENOUS ONCE
Status: COMPLETED | OUTPATIENT
Start: 2024-05-17 | End: 2024-05-17

## 2024-05-17 RX ADMIN — KETOROLAC TROMETHAMINE 15 MG: 30 INJECTION, SOLUTION INTRAMUSCULAR at 19:03

## 2024-05-17 RX ADMIN — IOPAMIDOL 75 ML: 755 INJECTION, SOLUTION INTRAVENOUS at 20:08

## 2024-05-17 ASSESSMENT — ENCOUNTER SYMPTOMS
ABDOMINAL PAIN: 1
VOMITING: 0
BACK PAIN: 0
COLOR CHANGE: 0
SHORTNESS OF BREATH: 0
COUGH: 0

## 2024-05-17 ASSESSMENT — PAIN SCALES - GENERAL
PAINLEVEL_OUTOF10: 5
PAINLEVEL_OUTOF10: 10

## 2024-05-17 ASSESSMENT — PAIN DESCRIPTION - LOCATION: LOCATION: ABDOMEN

## 2024-05-17 ASSESSMENT — PAIN DESCRIPTION - DESCRIPTORS: DESCRIPTORS: DISCOMFORT

## 2024-05-17 ASSESSMENT — PAIN DESCRIPTION - ORIENTATION: ORIENTATION: LEFT;LOWER

## 2024-05-17 NOTE — ED PROVIDER NOTES
HPI   29-year-old female patient presents emergency department for evaluation of abdominal pain.  She been having left lower quadrant abdominal pain for the last couple of days.  She says that nothing she can think of was she was doing sit ups on a decline the day before symptoms and started.  The following day she has been having some pressure sensation in that area.  She says it did get a little bit better with a bowel movement but she can still feel it and it is also worsened by her running and doing sit ups.  No numbness or weakness anywhere.  Pain is now going on for the last 2 days.  No nausea or vomiting.  No chest pain or shortness of breath.  No blood in the stool.  She is currently on her menstrual cycle.  Review of Systems   Constitutional:  Negative for chills and fever.   HENT:  Negative for congestion.    Respiratory:  Negative for cough and shortness of breath.    Cardiovascular:  Negative for chest pain.   Gastrointestinal:  Positive for abdominal pain. Negative for diarrhea, nausea and vomiting.   Genitourinary:  Negative for difficulty urinating, dysuria and hematuria.   Musculoskeletal:  Negative for back pain.   Skin:  Negative for color change.   All other systems reviewed and are negative.       Physical Exam  Vitals and nursing note reviewed.   Constitutional:       Appearance: Normal appearance.   HENT:      Head: Normocephalic and atraumatic.      Nose: Nose normal. No congestion.      Mouth/Throat:      Mouth: Mucous membranes are moist.      Pharynx: Oropharynx is clear.   Eyes:      Conjunctiva/sclera: Conjunctivae normal.      Pupils: Pupils are equal, round, and reactive to light.   Cardiovascular:      Rate and Rhythm: Normal rate and regular rhythm.      Pulses: Normal pulses.      Heart sounds: Normal heart sounds.   Pulmonary:      Effort: Pulmonary effort is normal. No respiratory distress.      Breath sounds: Normal breath sounds.   Abdominal:      General: There is no

## 2024-05-26 ENCOUNTER — HOSPITAL ENCOUNTER (EMERGENCY)
Age: 29
Discharge: HOME | End: 2024-05-27
Attending: STUDENT IN AN ORGANIZED HEALTH CARE EDUCATION/TRAINING PROGRAM
Payer: COMMERCIAL

## 2024-05-26 ENCOUNTER — APPOINTMENT (OUTPATIENT)
Dept: CT IMAGING | Age: 29
End: 2024-05-26
Payer: COMMERCIAL

## 2024-05-26 VITALS
RESPIRATION RATE: 16 BRPM | WEIGHT: 135 LBS | HEART RATE: 62 BPM | DIASTOLIC BLOOD PRESSURE: 96 MMHG | OXYGEN SATURATION: 100 % | TEMPERATURE: 98.4 F | BODY MASS INDEX: 23.91 KG/M2 | SYSTOLIC BLOOD PRESSURE: 137 MMHG

## 2024-05-26 DIAGNOSIS — R10.12 ABDOMINAL PAIN, LEFT UPPER QUADRANT: ICD-10-CM

## 2024-05-26 DIAGNOSIS — R10.32 ABDOMINAL PAIN, LEFT LOWER QUADRANT: ICD-10-CM

## 2024-05-26 DIAGNOSIS — R10.9 FLANK PAIN: Primary | ICD-10-CM

## 2024-05-26 LAB
ALBUMIN SERPL-MCNC: 4 G/DL (ref 3.5–5.2)
ALP SERPL-CCNC: 55 U/L (ref 35–104)
ALT SERPL-CCNC: 14 U/L (ref 0–32)
AMORPH SED URNS QL MICRO: PRESENT
ANION GAP SERPL CALCULATED.3IONS-SCNC: 8 MMOL/L (ref 7–16)
AST SERPL-CCNC: 21 U/L (ref 0–31)
BASOPHILS # BLD: 0.04 K/UL (ref 0–0.2)
BASOPHILS NFR BLD: 1 % (ref 0–2)
BILIRUB SERPL-MCNC: 0.6 MG/DL (ref 0–1.2)
BILIRUB UR QL STRIP: NEGATIVE
BUN SERPL-MCNC: 19 MG/DL (ref 6–20)
CALCIUM SERPL-MCNC: 9 MG/DL (ref 8.6–10.2)
CHLORIDE SERPL-SCNC: 104 MMOL/L (ref 98–107)
CLARITY UR: CLEAR
CO2 SERPL-SCNC: 26 MMOL/L (ref 22–29)
COLOR UR: YELLOW
CREAT SERPL-MCNC: 0.8 MG/DL (ref 0.5–1)
EOSINOPHIL # BLD: 0.08 K/UL (ref 0.05–0.5)
EOSINOPHILS RELATIVE PERCENT: 1 % (ref 0–6)
EPI CELLS #/AREA URNS HPF: NORMAL /HPF
ERYTHROCYTE [DISTWIDTH] IN BLOOD BY AUTOMATED COUNT: 11.9 % (ref 11.5–15)
GFR, ESTIMATED: >90 ML/MIN/1.73M2
GLUCOSE SERPL-MCNC: 83 MG/DL (ref 74–99)
GLUCOSE UR STRIP-MCNC: NEGATIVE MG/DL
HCG, URINE, POC: NEGATIVE
HCT VFR BLD AUTO: 40.6 % (ref 34–48)
HGB BLD-MCNC: 13.3 G/DL (ref 11.5–15.5)
HGB UR QL STRIP.AUTO: NEGATIVE
IMM GRANULOCYTES # BLD AUTO: <0.03 K/UL (ref 0–0.58)
IMM GRANULOCYTES NFR BLD: 0 % (ref 0–5)
KETONES UR STRIP-MCNC: NEGATIVE MG/DL
LACTATE BLDV-SCNC: 0.8 MMOL/L (ref 0.5–2.2)
LEUKOCYTE ESTERASE UR QL STRIP: NEGATIVE
LYMPHOCYTES NFR BLD: 3.54 K/UL (ref 1.5–4)
LYMPHOCYTES RELATIVE PERCENT: 56 % (ref 20–42)
Lab: NORMAL
MAGNESIUM SERPL-MCNC: 2 MG/DL (ref 1.6–2.6)
MCH RBC QN AUTO: 30.8 PG (ref 26–35)
MCHC RBC AUTO-ENTMCNC: 32.8 G/DL (ref 32–34.5)
MCV RBC AUTO: 94 FL (ref 80–99.9)
MONOCYTES NFR BLD: 0.35 K/UL (ref 0.1–0.95)
MONOCYTES NFR BLD: 6 % (ref 2–12)
NEGATIVE QC PASS/FAIL: NORMAL
NEUTROPHILS NFR BLD: 37 % (ref 43–80)
NEUTS SEG NFR BLD: 2.31 K/UL (ref 1.8–7.3)
NITRITE UR QL STRIP: NEGATIVE
PH UR STRIP: 6.5 [PH] (ref 5–9)
PLATELET # BLD AUTO: 180 K/UL (ref 130–450)
PMV BLD AUTO: 10.7 FL (ref 7–12)
POSITIVE QC PASS/FAIL: NORMAL
POTASSIUM SERPL-SCNC: 4.1 MMOL/L (ref 3.5–5)
PROT SERPL-MCNC: 6.3 G/DL (ref 6.4–8.3)
PROT UR STRIP-MCNC: NEGATIVE MG/DL
RBC # BLD AUTO: 4.32 M/UL (ref 3.5–5.5)
RBC #/AREA URNS HPF: NORMAL /HPF
SODIUM SERPL-SCNC: 138 MMOL/L (ref 132–146)
SP GR UR STRIP: 1.01 (ref 1–1.03)
UROBILINOGEN UR STRIP-ACNC: 0.2 EU/DL (ref 0–1)
WBC #/AREA URNS HPF: NORMAL /HPF
WBC OTHER # BLD: 6.3 K/UL (ref 4.5–11.5)

## 2024-05-26 PROCEDURE — 81001 URINALYSIS AUTO W/SCOPE: CPT

## 2024-05-26 PROCEDURE — 80053 COMPREHEN METABOLIC PANEL: CPT

## 2024-05-26 PROCEDURE — 96374 THER/PROPH/DIAG INJ IV PUSH: CPT

## 2024-05-26 PROCEDURE — 6360000002 HC RX W HCPCS

## 2024-05-26 PROCEDURE — 74177 CT ABD & PELVIS W/CONTRAST: CPT

## 2024-05-26 PROCEDURE — 83605 ASSAY OF LACTIC ACID: CPT

## 2024-05-26 PROCEDURE — 83735 ASSAY OF MAGNESIUM: CPT

## 2024-05-26 PROCEDURE — 99285 EMERGENCY DEPT VISIT HI MDM: CPT

## 2024-05-26 PROCEDURE — 6360000004 HC RX CONTRAST MEDICATION: Performed by: RADIOLOGY

## 2024-05-26 PROCEDURE — 2580000003 HC RX 258

## 2024-05-26 PROCEDURE — 85025 COMPLETE CBC W/AUTO DIFF WBC: CPT

## 2024-05-26 RX ORDER — 0.9 % SODIUM CHLORIDE 0.9 %
1000 INTRAVENOUS SOLUTION INTRAVENOUS ONCE
Status: COMPLETED | OUTPATIENT
Start: 2024-05-26 | End: 2024-05-26

## 2024-05-26 RX ORDER — KETOROLAC TROMETHAMINE 15 MG/ML
15 INJECTION, SOLUTION INTRAMUSCULAR; INTRAVENOUS ONCE
Status: COMPLETED | OUTPATIENT
Start: 2024-05-26 | End: 2024-05-26

## 2024-05-26 RX ADMIN — SODIUM CHLORIDE 1000 ML: 9 INJECTION, SOLUTION INTRAVENOUS at 22:27

## 2024-05-26 RX ADMIN — IOPAMIDOL 75 ML: 755 INJECTION, SOLUTION INTRAVENOUS at 23:46

## 2024-05-26 RX ADMIN — KETOROLAC TROMETHAMINE 15 MG: 15 INJECTION, SOLUTION INTRAMUSCULAR; INTRAVENOUS at 22:28

## 2024-05-26 ASSESSMENT — LIFESTYLE VARIABLES
HOW OFTEN DO YOU HAVE A DRINK CONTAINING ALCOHOL: NEVER
HOW MANY STANDARD DRINKS CONTAINING ALCOHOL DO YOU HAVE ON A TYPICAL DAY: PATIENT DOES NOT DRINK

## 2024-05-26 ASSESSMENT — PAIN DESCRIPTION - LOCATION: LOCATION: ABDOMEN

## 2024-05-26 ASSESSMENT — PAIN SCALES - GENERAL: PAINLEVEL_OUTOF10: 4

## 2024-05-26 ASSESSMENT — PAIN - FUNCTIONAL ASSESSMENT: PAIN_FUNCTIONAL_ASSESSMENT: 0-10

## 2024-05-27 RX ORDER — LIDOCAINE 50 MG/G
1 PATCH TOPICAL DAILY
Qty: 10 PATCH | Refills: 0 | Status: SHIPPED | OUTPATIENT
Start: 2024-05-27 | End: 2024-06-06

## 2024-05-27 NOTE — ED PROVIDER NOTES
ATTENDING PROVIDER ATTESTATION:     Laurel Carson presented to the emergency department for evaluation of Abdominal Pain (Seen in [place] for same issue last weeks, states not improved ) and Flank Pain (Left flank x 5 days )   and was initially evaluated by the Medical Resident. See Original ED Note for H&P and ED course above.     I have reviewed and discussed the case, including pertinent history (medical, surgical, family and social) and exam findings with the Medical Resident assigned to Laurel Carson.  I have personally performed and/or participated in the history, exam, medical decision making, EKG interpretation and procedures and agree with all pertinent clinical information.           I have reviewed my findings and recommendations with the assigned Medical Resident, Laurel Carson and members of family present at the time of disposition.    My findings/plan: The primary encounter diagnosis was Flank pain. Diagnoses of Abdominal pain, left lower quadrant and Abdominal pain, left upper quadrant were also pertinent to this visit.  Discharge Medication List as of 5/27/2024  1:11 AM        START taking these medications    Details   lidocaine (LIDODERM) 5 % Place 1 patch onto the skin daily for 10 days 12 hours on, 12 hours off., Disp-10 patch, R-0Normal           Emeka Forrester MD      29-year-old female with no past significant pertinent medical history presents to the emergency department for the complaints of left flank pain.  Left flank pain started on Thursday and radiates to her left groin.  The symptoms have been intermittent described as a sharp-like sensation.  No aggravating leaving factors noted.  She denies any nausea vomiting accompanying her symptoms.  Denies any fever or chills.  Denies any hematuria or urinary symptoms.  Denies GI bleed.  She also denies any chest pain shortness of breath cough or congestion.  The patient also been having left sided lower quadrant abdominal

## 2024-05-30 ENCOUNTER — TELEPHONE (OUTPATIENT)
Dept: PRIMARY CARE CLINIC | Age: 29
End: 2024-05-30

## 2024-05-30 NOTE — TELEPHONE ENCOUNTER
The pt has been having abdominal pain/pressure and bulging in her left lower quadrant for about 3 weeks now, she was in the ED twice and saw a surgeon twice about it, they thought she had a hernia but they confirmed it's not, she had a CT both times she was in the ED, she is scheduled to get a colonoscopy on 7/8 with Dr Richard, they told her she was constipated and that has been getting better because she has been going, she just doesn't know what to do

## 2024-06-04 NOTE — TELEPHONE ENCOUNTER
Patient advised, she has those apt set up. She is leaving tomorrow for her honeymoon so she was getting nervous but it has been better with the miralax. She will call or message us when she returns to schedule a follow up apt

## 2024-06-20 ENCOUNTER — TELEPHONE (OUTPATIENT)
Dept: PRIMARY CARE CLINIC | Age: 29
End: 2024-06-20

## 2024-06-20 NOTE — TELEPHONE ENCOUNTER
Pt looking for an appointment but nothing available, having extreme fatigue. She is available on Friday afternoons or early in the morning. Requesting bloodwork also.

## 2024-06-25 ASSESSMENT — PATIENT HEALTH QUESTIONNAIRE - PHQ9
1. LITTLE INTEREST OR PLEASURE IN DOING THINGS: NOT AT ALL
SUM OF ALL RESPONSES TO PHQ9 QUESTIONS 1 & 2: 0
SUM OF ALL RESPONSES TO PHQ QUESTIONS 1-9: 0
2. FEELING DOWN, DEPRESSED OR HOPELESS: NOT AT ALL
SUM OF ALL RESPONSES TO PHQ QUESTIONS 1-9: 0
SUM OF ALL RESPONSES TO PHQ QUESTIONS 1-9: 0
SUM OF ALL RESPONSES TO PHQ9 QUESTIONS 1 & 2: 0
2. FEELING DOWN, DEPRESSED OR HOPELESS: NOT AT ALL
SUM OF ALL RESPONSES TO PHQ QUESTIONS 1-9: 0
1. LITTLE INTEREST OR PLEASURE IN DOING THINGS: NOT AT ALL

## 2024-06-28 ENCOUNTER — OFFICE VISIT (OUTPATIENT)
Dept: FAMILY MEDICINE CLINIC | Age: 29
End: 2024-06-28
Payer: COMMERCIAL

## 2024-06-28 VITALS
HEIGHT: 63 IN | WEIGHT: 141 LBS | BODY MASS INDEX: 24.98 KG/M2 | DIASTOLIC BLOOD PRESSURE: 82 MMHG | SYSTOLIC BLOOD PRESSURE: 124 MMHG | HEART RATE: 69 BPM | RESPIRATION RATE: 16 BRPM | OXYGEN SATURATION: 100 % | TEMPERATURE: 97.9 F

## 2024-06-28 DIAGNOSIS — E03.9 ACQUIRED HYPOTHYROIDISM: Primary | ICD-10-CM

## 2024-06-28 DIAGNOSIS — R06.02 SOB (SHORTNESS OF BREATH): ICD-10-CM

## 2024-06-28 DIAGNOSIS — D64.9 ANEMIA, UNSPECIFIED TYPE: ICD-10-CM

## 2024-06-28 DIAGNOSIS — R53.83 OTHER FATIGUE: ICD-10-CM

## 2024-06-28 DIAGNOSIS — E55.9 VITAMIN D INSUFFICIENCY: ICD-10-CM

## 2024-06-28 PROCEDURE — 99214 OFFICE O/P EST MOD 30 MIN: CPT | Performed by: FAMILY MEDICINE

## 2024-06-28 PROCEDURE — G8427 DOCREV CUR MEDS BY ELIG CLIN: HCPCS | Performed by: FAMILY MEDICINE

## 2024-06-28 PROCEDURE — 1036F TOBACCO NON-USER: CPT | Performed by: FAMILY MEDICINE

## 2024-06-28 PROCEDURE — G8420 CALC BMI NORM PARAMETERS: HCPCS | Performed by: FAMILY MEDICINE

## 2024-06-28 RX ORDER — ALBUTEROL SULFATE 90 UG/1
2 AEROSOL, METERED RESPIRATORY (INHALATION) 4 TIMES DAILY PRN
Qty: 18 G | Refills: 0 | Status: SHIPPED | OUTPATIENT
Start: 2024-06-28

## 2024-07-08 ENCOUNTER — TELEPHONE (OUTPATIENT)
Dept: FAMILY MEDICINE CLINIC | Age: 29
End: 2024-07-08

## 2024-07-08 DIAGNOSIS — E03.9 ACQUIRED HYPOTHYROIDISM: ICD-10-CM

## 2024-07-08 DIAGNOSIS — E55.9 VITAMIN D INSUFFICIENCY: ICD-10-CM

## 2024-07-08 DIAGNOSIS — R53.83 OTHER FATIGUE: ICD-10-CM

## 2024-07-08 DIAGNOSIS — D64.9 ANEMIA, UNSPECIFIED TYPE: ICD-10-CM

## 2024-07-08 LAB
ALBUMIN: 4 G/DL
ALP BLD-CCNC: 38 U/L
ALT SERPL-CCNC: 12 U/L
ANION GAP SERPL CALCULATED.3IONS-SCNC: NORMAL MMOL/L
AST SERPL-CCNC: 18 U/L
BASOPHILS ABSOLUTE: 31 /ΜL
BASOPHILS RELATIVE PERCENT: 0.6 %
BILIRUB SERPL-MCNC: 0.7 MG/DL (ref 0.1–1.4)
BUN BLDV-MCNC: 21 MG/DL
CALCIUM SERPL-MCNC: 9.4 MG/DL
CHLORIDE BLD-SCNC: 103 MMOL/L
CO2: 28 MMOL/L
CREAT SERPL-MCNC: 0.98 MG/DL
EOSINOPHILS ABSOLUTE: 42 /ΜL
EOSINOPHILS RELATIVE PERCENT: 0.8 %
FERRITIN: 49 NG/ML (ref 9–150)
FOLATE: 17.5
GFR, ESTIMATED: 80
GLUCOSE BLD-MCNC: 83 MG/DL
HCT VFR BLD CALC: 41.2 % (ref 36–46)
HEMOGLOBIN: 14.1 G/DL (ref 12–16)
IRON: 117
LYMPHOCYTES ABSOLUTE: 2491 /ΜL
LYMPHOCYTES RELATIVE PERCENT: 47.9 %
MCH RBC QN AUTO: 31.7 PG
MCHC RBC AUTO-ENTMCNC: 34.2 G/DL
MCV RBC AUTO: 92.6 FL
MONOCYTES ABSOLUTE: 302 /ΜL
MONOCYTES RELATIVE PERCENT: 5.8 %
NEUTROPHILS ABSOLUTE: 2335 /ΜL
NEUTROPHILS RELATIVE PERCENT: 44.9 %
PDW BLD-RTO: 12.1 %
PLATELET # BLD: 218 K/ΜL
PMV BLD AUTO: 11.4 FL
POTASSIUM SERPL-SCNC: 4.4 MMOL/L
RBC # BLD: 4.45 10^6/ΜL
SODIUM BLD-SCNC: 139 MMOL/L
T3 FREE: 3.1
T4 FREE: 1.2
TOTAL IRON BINDING CAPACITY: 384
TOTAL PROTEIN: 6.6 G/DL (ref 6.4–8.2)
TSH SERPL DL<=0.05 MIU/L-ACNC: 4.99 UIU/ML
VITAMIN B-12: 414
VITAMIN D 25-HYDROXY: 68
VITAMIN D2, 25 HYDROXY: NORMAL
VITAMIN D3,25 HYDROXY: NORMAL
WBC # BLD: 5.2 10^3/ML

## 2024-07-08 NOTE — TELEPHONE ENCOUNTER
pt called re: cardiac event monitor as she has not heard anything yet. called [facility] and they did not receive refferal. faxed referral  along with demographics to f# [phone] and confirmation received. They will call pt with appt. I gave pt [facility] phone number to f/u.   Pt also saw labs, TSH, from [facility] and states with her tiredness and heart rate still dropping do you want to adjust her thyroid med. She states had colonoscopy today and when she was in recovery her heart rate per pt was between 41-47. She does not want to jump med up to 50mcg daily because she is scared to do that. any recommendations. Called [facility] to fax 7/3 labs over. Please advise.

## 2024-07-08 NOTE — TELEPHONE ENCOUNTER
I reviewed patient's labs.  Her thyroid is pretty underactive and could be the main cause for her fatigue, constipation, and bradycardia.  If she doesn't want to increase to 50 mcg every day, then I would at least suggest increasing to 50 mcg (2 tablets) on the weekends.   There remainder of her labs were normal

## 2024-07-09 RX ORDER — LEVOTHYROXINE SODIUM 0.03 MG/1
TABLET ORAL
Qty: 40 TABLET | Refills: 5 | Status: SHIPPED | OUTPATIENT
Start: 2024-07-09

## 2024-07-09 NOTE — TELEPHONE ENCOUNTER
I spoke to Ramses and discussed the option suggested.    She is agreeable to taking levothyroxine 25 mg for 5 days each weak and taking 2 of them on 2 other days during the week.    She would rather space out the double dose than to take it for 2 straight days.      I have a new script ready to send because she is running low on these.       Last Appointment:  6/28/2024  No future appointments.

## 2024-07-13 ASSESSMENT — ENCOUNTER SYMPTOMS
SHORTNESS OF BREATH: 1
BACK PAIN: 0
WHEEZING: 0
VOMITING: 0
CONSTIPATION: 1
COUGH: 0
DIARRHEA: 0
NAUSEA: 0
ABDOMINAL PAIN: 0

## 2024-07-15 ENCOUNTER — TELEPHONE (OUTPATIENT)
Dept: FAMILY MEDICINE CLINIC | Age: 29
End: 2024-07-15

## 2024-07-15 NOTE — TELEPHONE ENCOUNTER
Those side effects are likely due to improvement in the thyroid function so she is becoming more hyperthyroid.  I would have her just do the 50 mcg on one day/week

## 2024-07-15 NOTE — TELEPHONE ENCOUNTER
Patient called in and stated that she has been on levothyroxine the last two weeks.  Since increasing her dose to 50 mcg weds and Saturday patient noticed that she has had palpitations at times and no appetite. She states that her tiredness and brain fog has gone away. She has never heard of these side effects with this medication. She states she is okay to stay on this med and wanted to just let you know of these side effects. She starts a new job Monday and did not want to have to worry

## 2024-07-15 NOTE — TELEPHONE ENCOUNTER
Patient informed of below info from Dr. Amezcua.  Patient understood and will try the 50 mcg one day/ week and will let us know if she has any issues

## 2024-07-16 ENCOUNTER — OFFICE VISIT (OUTPATIENT)
Dept: FAMILY MEDICINE CLINIC | Age: 29
End: 2024-07-16
Payer: COMMERCIAL

## 2024-07-16 ENCOUNTER — TELEPHONE (OUTPATIENT)
Dept: FAMILY MEDICINE CLINIC | Age: 29
End: 2024-07-16

## 2024-07-16 ENCOUNTER — HOSPITAL ENCOUNTER (EMERGENCY)
Age: 29
Discharge: HOME | End: 2024-07-17
Payer: COMMERCIAL

## 2024-07-16 VITALS
HEIGHT: 63 IN | BODY MASS INDEX: 24.27 KG/M2 | WEIGHT: 137 LBS | SYSTOLIC BLOOD PRESSURE: 132 MMHG | TEMPERATURE: 97.5 F | OXYGEN SATURATION: 98 % | HEART RATE: 84 BPM | DIASTOLIC BLOOD PRESSURE: 88 MMHG

## 2024-07-16 DIAGNOSIS — G44.82 HEADACHE ASSOCIATED WITH SEXUAL ACTIVITY: ICD-10-CM

## 2024-07-16 DIAGNOSIS — R03.0 ELEVATED BLOOD PRESSURE READING IN OFFICE WITHOUT DIAGNOSIS OF HYPERTENSION: ICD-10-CM

## 2024-07-16 DIAGNOSIS — R79.89 ELEVATED SERUM CREATININE: ICD-10-CM

## 2024-07-16 DIAGNOSIS — G44.59 OTHER COMPLICATED HEADACHE SYNDROME: Primary | ICD-10-CM

## 2024-07-16 DIAGNOSIS — R03.0 ELEVATED BLOOD-PRESSURE READING WITHOUT DIAGNOSIS OF HYPERTENSION: ICD-10-CM

## 2024-07-16 DIAGNOSIS — R51.9 ACUTE NONINTRACTABLE HEADACHE, UNSPECIFIED HEADACHE TYPE: Primary | ICD-10-CM

## 2024-07-16 LAB
ANION GAP SERPL CALCULATED.3IONS-SCNC: 11 MMOL/L (ref 7–16)
BASOPHILS # BLD: 0.04 K/UL (ref 0–0.2)
BASOPHILS NFR BLD: 1 % (ref 0–2)
BUN SERPL-MCNC: 21 MG/DL (ref 6–20)
CALCIUM SERPL-MCNC: 9.1 MG/DL (ref 8.6–10.2)
CHLORIDE SERPL-SCNC: 101 MMOL/L (ref 98–107)
CO2 SERPL-SCNC: 26 MMOL/L (ref 22–29)
CREAT SERPL-MCNC: 1.1 MG/DL (ref 0.5–1)
EOSINOPHIL # BLD: 0.05 K/UL (ref 0.05–0.5)
EOSINOPHILS RELATIVE PERCENT: 1 % (ref 0–6)
ERYTHROCYTE [DISTWIDTH] IN BLOOD BY AUTOMATED COUNT: 12.2 % (ref 11.5–15)
GFR, ESTIMATED: 73 ML/MIN/1.73M2
GLUCOSE SERPL-MCNC: 72 MG/DL (ref 74–99)
HCG, URINE, POC: NEGATIVE
HCT VFR BLD AUTO: 42.7 % (ref 34–48)
HGB BLD-MCNC: 14.4 G/DL (ref 11.5–15.5)
IMM GRANULOCYTES # BLD AUTO: <0.03 K/UL (ref 0–0.58)
IMM GRANULOCYTES NFR BLD: 0 % (ref 0–5)
LYMPHOCYTES NFR BLD: 3.42 K/UL (ref 1.5–4)
LYMPHOCYTES RELATIVE PERCENT: 62 % (ref 20–42)
Lab: NORMAL
MCH RBC QN AUTO: 31 PG (ref 26–35)
MCHC RBC AUTO-ENTMCNC: 33.7 G/DL (ref 32–34.5)
MCV RBC AUTO: 92 FL (ref 80–99.9)
MONOCYTES NFR BLD: 0.36 K/UL (ref 0.1–0.95)
MONOCYTES NFR BLD: 7 % (ref 2–12)
NEGATIVE QC PASS/FAIL: NORMAL
NEUTROPHILS NFR BLD: 30 % (ref 43–80)
NEUTS SEG NFR BLD: 1.64 K/UL (ref 1.8–7.3)
PLATELET # BLD AUTO: 245 K/UL (ref 130–450)
PMV BLD AUTO: 10.4 FL (ref 7–12)
POSITIVE QC PASS/FAIL: NORMAL
POTASSIUM SERPL-SCNC: 4.4 MMOL/L (ref 3.5–5)
RBC # BLD AUTO: 4.64 M/UL (ref 3.5–5.5)
SODIUM SERPL-SCNC: 138 MMOL/L (ref 132–146)
TSH SERPL DL<=0.05 MIU/L-ACNC: 3.18 UIU/ML (ref 0.27–4.2)
WBC OTHER # BLD: 5.5 K/UL (ref 4.5–11.5)

## 2024-07-16 PROCEDURE — 99214 OFFICE O/P EST MOD 30 MIN: CPT

## 2024-07-16 PROCEDURE — 86140 C-REACTIVE PROTEIN: CPT

## 2024-07-16 PROCEDURE — 6360000002 HC RX W HCPCS

## 2024-07-16 PROCEDURE — 96374 THER/PROPH/DIAG INJ IV PUSH: CPT

## 2024-07-16 PROCEDURE — 84443 ASSAY THYROID STIM HORMONE: CPT

## 2024-07-16 PROCEDURE — 80048 BASIC METABOLIC PNL TOTAL CA: CPT

## 2024-07-16 PROCEDURE — 85652 RBC SED RATE AUTOMATED: CPT

## 2024-07-16 PROCEDURE — 93000 ELECTROCARDIOGRAM COMPLETE: CPT

## 2024-07-16 PROCEDURE — 85025 COMPLETE CBC W/AUTO DIFF WBC: CPT

## 2024-07-16 PROCEDURE — G8427 DOCREV CUR MEDS BY ELIG CLIN: HCPCS

## 2024-07-16 PROCEDURE — 99285 EMERGENCY DEPT VISIT HI MDM: CPT

## 2024-07-16 PROCEDURE — 96375 TX/PRO/DX INJ NEW DRUG ADDON: CPT

## 2024-07-16 PROCEDURE — G8420 CALC BMI NORM PARAMETERS: HCPCS

## 2024-07-16 PROCEDURE — 2580000003 HC RX 258

## 2024-07-16 PROCEDURE — 1036F TOBACCO NON-USER: CPT

## 2024-07-16 RX ORDER — 0.9 % SODIUM CHLORIDE 0.9 %
1000 INTRAVENOUS SOLUTION INTRAVENOUS ONCE
Status: COMPLETED | OUTPATIENT
Start: 2024-07-16 | End: 2024-07-17

## 2024-07-16 RX ORDER — METOCLOPRAMIDE HYDROCHLORIDE 5 MG/ML
10 INJECTION INTRAMUSCULAR; INTRAVENOUS ONCE
Status: COMPLETED | OUTPATIENT
Start: 2024-07-16 | End: 2024-07-16

## 2024-07-16 RX ORDER — DIPHENHYDRAMINE HYDROCHLORIDE 50 MG/ML
25 INJECTION INTRAMUSCULAR; INTRAVENOUS ONCE
Status: COMPLETED | OUTPATIENT
Start: 2024-07-16 | End: 2024-07-16

## 2024-07-16 RX ADMIN — METOCLOPRAMIDE 10 MG: 5 INJECTION, SOLUTION INTRAMUSCULAR; INTRAVENOUS at 23:05

## 2024-07-16 RX ADMIN — SODIUM CHLORIDE 1000 ML: 9 INJECTION, SOLUTION INTRAVENOUS at 23:05

## 2024-07-16 RX ADMIN — DIPHENHYDRAMINE HYDROCHLORIDE 25 MG: 50 INJECTION INTRAMUSCULAR; INTRAVENOUS at 23:04

## 2024-07-16 ASSESSMENT — PAIN SCALES - GENERAL: PAINLEVEL_OUTOF10: 4

## 2024-07-16 ASSESSMENT — PAIN DESCRIPTION - LOCATION: LOCATION: HEAD

## 2024-07-16 ASSESSMENT — PAIN - FUNCTIONAL ASSESSMENT: PAIN_FUNCTIONAL_ASSESSMENT: 0-10

## 2024-07-16 NOTE — TELEPHONE ENCOUNTER
Left a message on the voicemail to call back to the nurse line for Dr Amezcua's response to her blood pressure elevations.

## 2024-07-16 NOTE — TELEPHONE ENCOUNTER
Patient was informed of below information- patient states that it is not severe enough to go to ER- she took ibuprofen and it has taken the edge off she has not had any neurologic symptoms. Patient states that she would like to see you or come in through [facility] tomorrow due to the fact the last day and a half her blood pressure was up 3 times in a day she informed me. Patient has never had high blood pressure before.

## 2024-07-16 NOTE — TELEPHONE ENCOUNTER
Laurel calling about a sudden onset headache during intercourse last night. It did not last long, but then her blood pressure was elevated. 140/97  & 140/96.    She took Tylenol, but it only felt worse, along with pressure.    She takes 50 mcg Synthroid one day a week and 25 mcg the other 6 days.     She is supposed to be changing to 50 mcg twice a week soon.      What is your advice?

## 2024-07-16 NOTE — TELEPHONE ENCOUNTER
Unfortunately, I don't have any openings tomorrow.  She is more than welcome to go through [facility] at any time to be checked out

## 2024-07-16 NOTE — PROGRESS NOTES
Chief Complaint       Headache and Hypertension      History of Present Illness   Source of history provided by:  patient.      Laurel Carson is a 29 y.o. old female presenting to the walk in clinic for evaluation of elevated blood pressure, which pt first noticed today. Highest reading was 140/92. She states that her headache is not as severe at it was during intercourse but still is dull in nature. She has taken acetaminophen and IBU with minimal relief. Pt denies CP, SOB, urinary difficulties, neck stiffness, dizziness, ringing of the ears, weakness, slurred speech, visual changes, syncope, or lethargy. Pt is not currently being treated for hypertension but did have her Levothyroxine adjusted recently. Reports being complaint with all medications. She is able to perform her ADLs and even was able to exercise this morning.     ROS    Unless otherwise stated in this report or unable to obtain because of the patient's clinical or mental status as evidenced by the medical record, this patients's positive and negative responses for Review of Systems, constitutional, psych, eyes, ENT, cardiovascular, respiratory, gastrointestinal, neurological, genitourinary, musculoskeletal, integument systems and systems related to the presenting problem are either stated in the preceding or were not pertinent or were negative for the symptoms and/or complaints related to the medical problem.    Physical Exam         VS:  /88   Pulse 84   Temp 97.5 °F (36.4 °C)   Ht 1.6 m (5' 2.99\")   Wt 62.1 kg (137 lb)   SpO2 98%   BMI 24.27 kg/m²    Oxygen Saturation Interpretation: Normal.    Constitutional:  Alert, development consistent with age.  Eyes:  PERRL, EOMI, no discharge or conjunctival injection.  Neck:  Normal ROM.  Supple.  Lungs:  Clear to auscultation and breath sounds equal.  Heart:  Regular rate and rhythm, normal heart sounds, without pathological murmurs, ectopy, gallops, or rubs. LEs without clubbing,

## 2024-07-16 NOTE — TELEPHONE ENCOUNTER
This has nothing to do with her levothyroxine.   I would monitor her BP and see if she has any recurrence of symptoms.  If she develops any worsening headaches or change in neurologic symptoms, she needs to be in the ER

## 2024-07-16 NOTE — TELEPHONE ENCOUNTER
Spoke w/ Laurel, states that she is headed to [facility] now. Her blood pressure is 130-140/ at rest, which is not normal for her. Her only neurological symptom is a left sided headache that is not improving w/ NSAIDs.

## 2024-07-17 ENCOUNTER — APPOINTMENT (OUTPATIENT)
Dept: CT IMAGING | Age: 29
End: 2024-07-17
Payer: COMMERCIAL

## 2024-07-17 VITALS
RESPIRATION RATE: 16 BRPM | BODY MASS INDEX: 24.27 KG/M2 | SYSTOLIC BLOOD PRESSURE: 114 MMHG | HEIGHT: 63 IN | WEIGHT: 137 LBS | TEMPERATURE: 99 F | DIASTOLIC BLOOD PRESSURE: 85 MMHG | OXYGEN SATURATION: 98 % | HEART RATE: 63 BPM

## 2024-07-17 LAB
CRP SERPL HS-MCNC: <3 MG/L (ref 0–5)
ERYTHROCYTE [SEDIMENTATION RATE] IN BLOOD BY WESTERGREN METHOD: 3 MM/HR (ref 0–20)

## 2024-07-17 PROCEDURE — 6360000004 HC RX CONTRAST MEDICATION: Performed by: RADIOLOGY

## 2024-07-17 PROCEDURE — 96375 TX/PRO/DX INJ NEW DRUG ADDON: CPT

## 2024-07-17 PROCEDURE — 70496 CT ANGIOGRAPHY HEAD: CPT

## 2024-07-17 PROCEDURE — 6360000002 HC RX W HCPCS

## 2024-07-17 PROCEDURE — 70498 CT ANGIOGRAPHY NECK: CPT

## 2024-07-17 RX ORDER — KETOROLAC TROMETHAMINE 15 MG/ML
15 INJECTION, SOLUTION INTRAMUSCULAR; INTRAVENOUS ONCE
Status: COMPLETED | OUTPATIENT
Start: 2024-07-17 | End: 2024-07-17

## 2024-07-17 RX ADMIN — KETOROLAC TROMETHAMINE 15 MG: 15 INJECTION, SOLUTION INTRAMUSCULAR; INTRAVENOUS at 02:11

## 2024-07-17 RX ADMIN — IOPAMIDOL 75 ML: 755 INJECTION, SOLUTION INTRAVENOUS at 00:41

## 2024-07-17 ASSESSMENT — LIFESTYLE VARIABLES
HOW MANY STANDARD DRINKS CONTAINING ALCOHOL DO YOU HAVE ON A TYPICAL DAY: 1 OR 2
HOW OFTEN DO YOU HAVE A DRINK CONTAINING ALCOHOL: MONTHLY OR LESS

## 2024-07-17 ASSESSMENT — PAIN - FUNCTIONAL ASSESSMENT: PAIN_FUNCTIONAL_ASSESSMENT: 0-10

## 2024-07-17 ASSESSMENT — PAIN SCALES - GENERAL: PAINLEVEL_OUTOF10: 1

## 2024-07-17 NOTE — ED PROVIDER NOTES
turgor.  Warm, dry, without visible rash, unless noted elsewhere.  Neurological: GCS 15. CNII-XII grossly intact. Motor functions intact.     NIH Stroke Scale/Score at time of initial evaluation:  1A: Level of Consciousness 0 - alert; keenly responsive   1B: Ask Month and Age 0 - answers both questions correctly   1C: Tell Patient To Open and Close Eyes, then Hand  Squeeze 0 - performs both tasks correctly   2: Test Horizontal Extraocular Movements 0 - normal   3: Test Visual Fields 0 - no visual loss   4: Test Facial Palsy 0 - normal symmetric movement   5A: Test Left Arm Motor Drift 0 - no drift, limb holds 90 (or 45) degrees for full 10 seconds   5B: Test Right Arm Motor Drift 0 - no drift, limb holds 90 (or 45) degrees for full 10 seconds   6A: Test Left Leg Motor Drift 0 - no drift; leg holds 30 degree position for full 5 seconds   6B: Test Right Leg Motor Drift 0 - no drift; leg holds 30 degree position for full 5 seconds   7: Test Limb Ataxia   (FNF/Heel-Shin) 0 - absent   8: Test Sensation 0 - normal; no sensory loss   9: Test Language/Aphasia 0 - no aphasia, normal   10: Test Dysarthria 0 - normal   11: Test Extinction/Inattention 0 - no abnormality   Total 0       DIAGNOSTIC RESULTS   LABS:    Results for orders placed or performed during the hospital encounter of 07/16/24   CBC with Auto Differential   Result Value Ref Range    WBC 5.5 4.5 - 11.5 k/uL    RBC 4.64 3.50 - 5.50 m/uL    Hemoglobin 14.4 11.5 - 15.5 g/dL    Hematocrit 42.7 34.0 - 48.0 %    MCV 92.0 80.0 - 99.9 fL    MCH 31.0 26.0 - 35.0 pg    MCHC 33.7 32.0 - 34.5 g/dL    RDW 12.2 11.5 - 15.0 %    Platelets 245 130 - 450 k/uL    MPV 10.4 7.0 - 12.0 fL    Neutrophils % 30 (L) 43.0 - 80.0 %    Lymphocytes % 62 (H) 20.0 - 42.0 %    Monocytes % 7 2.0 - 12.0 %    Eosinophils % 1 0 - 6 %    Basophils % 1 0.0 - 2.0 %    Immature Granulocytes % 0 0.0 - 5.0 %    Neutrophils Absolute 1.64 (L) 1.80 - 7.30 k/uL    Lymphocytes Absolute 3.42 1.50 - 4.00

## 2024-07-18 ENCOUNTER — TELEPHONE (OUTPATIENT)
Dept: FAMILY MEDICINE CLINIC | Age: 29
End: 2024-07-18

## 2024-07-18 NOTE — TELEPHONE ENCOUNTER
Pt called in stating she went to urgent care and the ER, she states all of her tests came back ok, but she is still having this awful unilateral headache and is not sure where to go from here.     Please advise any instructions.

## 2024-07-18 NOTE — TELEPHONE ENCOUNTER
Pt called in wanting to know if there is any possible way she can be seen today or tomorrow morning. She said she feels like tomorrow at 415 is a long time to wait with the type of headache she has.  She did say she would take it if that was the only option since she did not get any relief with [facility] or the ER. Please Advise

## 2024-07-19 ENCOUNTER — OFFICE VISIT (OUTPATIENT)
Dept: FAMILY MEDICINE CLINIC | Age: 29
End: 2024-07-19
Payer: COMMERCIAL

## 2024-07-19 VITALS
OXYGEN SATURATION: 99 % | DIASTOLIC BLOOD PRESSURE: 82 MMHG | WEIGHT: 138 LBS | RESPIRATION RATE: 18 BRPM | HEART RATE: 84 BPM | TEMPERATURE: 98.4 F | SYSTOLIC BLOOD PRESSURE: 124 MMHG | HEIGHT: 63 IN | BODY MASS INDEX: 24.45 KG/M2

## 2024-07-19 DIAGNOSIS — R51.9 ACUTE INTRACTABLE HEADACHE, UNSPECIFIED HEADACHE TYPE: Primary | ICD-10-CM

## 2024-07-19 DIAGNOSIS — E03.9 ACQUIRED HYPOTHYROIDISM: ICD-10-CM

## 2024-07-19 DIAGNOSIS — R03.0 ELEVATED BP WITHOUT DIAGNOSIS OF HYPERTENSION: ICD-10-CM

## 2024-07-19 PROCEDURE — G8420 CALC BMI NORM PARAMETERS: HCPCS | Performed by: FAMILY MEDICINE

## 2024-07-19 PROCEDURE — 1036F TOBACCO NON-USER: CPT | Performed by: FAMILY MEDICINE

## 2024-07-19 PROCEDURE — G8427 DOCREV CUR MEDS BY ELIG CLIN: HCPCS | Performed by: FAMILY MEDICINE

## 2024-07-19 PROCEDURE — 99214 OFFICE O/P EST MOD 30 MIN: CPT | Performed by: FAMILY MEDICINE

## 2024-07-19 PROCEDURE — 96372 THER/PROPH/DIAG INJ SC/IM: CPT | Performed by: FAMILY MEDICINE

## 2024-07-19 RX ORDER — LEVOTHYROXINE SODIUM 25 UG/1
25 TABLET ORAL DAILY
Qty: 30 TABLET | Refills: 3 | Status: SHIPPED | OUTPATIENT
Start: 2024-07-19

## 2024-07-19 RX ORDER — KETOROLAC TROMETHAMINE 30 MG/ML
60 INJECTION, SOLUTION INTRAMUSCULAR; INTRAVENOUS ONCE
Status: COMPLETED | OUTPATIENT
Start: 2024-07-19 | End: 2024-07-19

## 2024-07-19 RX ADMIN — KETOROLAC TROMETHAMINE 60 MG: 30 INJECTION, SOLUTION INTRAMUSCULAR; INTRAVENOUS at 12:07

## 2024-07-20 ASSESSMENT — ENCOUNTER SYMPTOMS
CONSTIPATION: 1
NAUSEA: 0
VOMITING: 0
BACK PAIN: 0
WHEEZING: 0
COUGH: 0
DIARRHEA: 0
SHORTNESS OF BREATH: 0
ABDOMINAL PAIN: 0

## 2024-07-20 NOTE — PROGRESS NOTES
Thyroiditis Mother     Rheum Arthritis Mother     Hypertension Mother     Hypertension Father     High Cholesterol Father     Hypothyroidism Maternal Grandmother     Breast Cancer Paternal Grandmother     Lung Cancer Paternal Grandmother     Hyperthyroidism Maternal Aunt     Hypothyroidism Maternal Aunt      Social History     Socioeconomic History    Marital status: Single     Spouse name: Not on file    Number of children: Not on file    Years of education: Not on file    Highest education level: Not on file   Occupational History    Not on file   Tobacco Use    Smoking status: Never    Smokeless tobacco: Never   Vaping Use    Vaping Use: Never used   Substance and Sexual Activity    Alcohol use: No    Drug use: Never    Sexual activity: Not on file   Other Topics Concern    Not on file   Social History Narrative    PMH - Denies    PSX - [place] teeth    FMH - Denies except Paternal Grandmother Breast CA 80's        Social: [employer], STARTING 1/2020 [employer]        Single,No children     Social Determinants of Health     Financial Resource Strain: Low Risk  (11/3/2023)    Overall Financial Resource Strain (CARDIA)     Difficulty of Paying Living Expenses: Not hard at all   Food Insecurity: Not on file (11/3/2023)   Transportation Needs: Unknown (11/3/2023)    PRAPARE - Transportation     Lack of Transportation (Medical): Not on file     Lack of Transportation (Non-Medical): No   Physical Activity: Not on file   Stress: Not on file   Social Connections: Not on file   Intimate Partner Violence: Not on file   Housing Stability: Unknown (11/3/2023)    Housing Stability Vital Sign     Unable to Pay for Housing in the Last Year: Not on file     Number of Places Lived in the Last Year: Not on file     Unstable Housing in the Last Year: No       Vitals:    07/19/24 1135   BP: 124/82   Pulse: 84   Resp: 18   Temp: 98.4 °F (36.9 °C)   TempSrc: Temporal   SpO2: 99%   Weight: 62.6 kg (138 lb)   Height: 1.6 m (5' 3\")       Physical 
10:15 PM    PROTEINU NEGATIVE 05/26/2024 10:15 PM    PHUR 6.5 05/26/2024 10:15 PM    PHUR 7.5 12/06/2023 12:00 AM    WBCUA 0 TO 5 05/26/2024 10:15 PM    RBCUA 0 TO 2 05/26/2024 10:15 PM    RBCUA 1-3 06/16/2013 12:10 AM    BACTERIA RARE 06/16/2013 12:10 AM    CLARITYU Clear 12/06/2023 12:00 AM    SPECGRAV 1.020 12/15/2021 11:46 AM    LEUKOCYTESUR NEGATIVE 05/26/2024 10:15 PM    UROBILINOGEN 0.2 05/26/2024 10:15 PM    BILIRUBINUR NEGATIVE 05/26/2024 10:15 PM    BLOODU Negative 12/06/2023 12:00 AM    GLUCOSEU NEGATIVE 05/26/2024 10:15 PM    AMORPHOUS PRESENT 05/26/2024 10:15 PM     HgBA1c:    Lab Results   Component Value Date/Time    LABA1C 4.8 09/23/2022 12:00 AM     FLP:    Lab Results   Component Value Date/Time    TRIG 73 12/06/2023 12:00 AM    HDL 63 12/06/2023 12:00 AM     TSH:    Lab Results   Component Value Date/Time    TSH 3.18 07/16/2024 11:00 PM          Assessment and Plan:  Laurel was seen today for headache.    Diagnoses and all orders for this visit:    Acute intractable headache, unspecified headache type  -     ketorolac (TORADOL) injection 60 mg  -     MRI BRAIN WO CONTRAST; Future  [facility]ed all reports from [facility] and [facility].  Discussed normal findings of brain scans.  Patient concerned about temporal arteritis, but ESR was normal and scans were normal.  Pain does no match descriptions of GCA.  Will check MRI to rule out any other structural abnormalities of the tissues of the brain.  Toradol today.  Suspicious for TMJ with distribution to temporalis muscle, though, given location of symptoms and recent dental work     Acquired hypothyroidism  -     EUTHYROX 25 MCG tablet; Take 1 tablet by mouth Daily  -     T4, Free; Future  -     TSH; Future  -     Thyroid Peroxidase Antibody; Future  Discussed the possibility of 225 mcg of levothyroxine weekly may be too much due to headaches, elevated BP, tachycardia, etc.  May need branded levothyroxine to help better control at lower dosing.      Elevated BP

## 2024-07-22 ENCOUNTER — TELEPHONE (OUTPATIENT)
Dept: FAMILY MEDICINE CLINIC | Age: 29
End: 2024-07-22

## 2024-07-22 DIAGNOSIS — E03.9 ACQUIRED HYPOTHYROIDISM: Primary | ICD-10-CM

## 2024-07-22 NOTE — TELEPHONE ENCOUNTER
Pharmacist from [facility] calling about the Euthrox script that they received on this patient.    They have never filled this for her.  It is not covered by her insurance.  They have filled Levothyroxine for her in the past.    Are you willing to prescribe this for her?

## 2024-07-22 NOTE — TELEPHONE ENCOUNTER
She is intolerant to the levothyroxine.  Looking for a branded levothyroxine to see if she does better.  Is there one that is covered on her plan?

## 2024-07-23 RX ORDER — LEVOTHYROXINE SODIUM 25 MCG
25 TABLET ORAL DAILY
Qty: 30 TABLET | Refills: 5 | Status: SHIPPED | OUTPATIENT
Start: 2024-07-23

## 2024-07-31 ENCOUNTER — TELEPHONE (OUTPATIENT)
Dept: PRIMARY CARE CLINIC | Age: 29
End: 2024-07-31

## 2024-07-31 DIAGNOSIS — M26.629 TMJ PAIN DYSFUNCTION SYNDROME: ICD-10-CM

## 2024-07-31 DIAGNOSIS — R51.9 ACUTE INTRACTABLE HEADACHE, UNSPECIFIED HEADACHE TYPE: ICD-10-CM

## 2024-07-31 NOTE — TELEPHONE ENCOUNTER
Left message advising patient that both her XR TMJ UNILATERL and MRI HEAD/BRAIN WO CON were normal. She is to call the office with any questions

## 2024-08-02 ENCOUNTER — TELEPHONE (OUTPATIENT)
Dept: FAMILY MEDICINE CLINIC | Age: 29
End: 2024-08-02

## 2024-08-02 NOTE — TELEPHONE ENCOUNTER
A lot of these same symptoms can be related to being on the Prednisone.  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 I can send her back to Endocrinology if she feels that the brand Synthroid is not correct for her either.

## 2024-08-02 NOTE — TELEPHONE ENCOUNTER
Patient called into the office in regards to the random shaking she is experiencing.   Patient stated that it's almost more like a tremor.     Patient stated that this was occurring when she was on the prednisone. This would happen before and after she ate. Patient stated that she finished the prednisone 2 days ago and it is still occurring.    Patient is wondering could this be a side effect from the medication or is something else going on?    Please advise.

## 2024-08-02 NOTE — TELEPHONE ENCOUNTER
Relayed your response to Laurel she will give. She also relayed more information regarding her symptoms. She is wondering is symptoms are related to her hypothyroid. She was recently switched to brand Synthroid.     Symptoms started 2 weeks ago. She has pressure behind her eyes and in her head. Fells like she is walking on a cloud. If she leans forward or backward she feels like she is going to fall over. Feels weak and odd. Blood sugar goes low and she has to eat every 2 hours or she feels like she is going to pass out. She feels light headed when she needs to eat and dizzy when she is eating. Also feels irritable when she doesn't eat which she attributes to the prednisone. When she lifts weights she has a tremor.     She was on a tapering dose of Prednisone and finished 3 days of 10mg two days ago.     Any additional thoughts or recommendations?

## 2024-08-20 ENCOUNTER — TELEMEDICINE (OUTPATIENT)
Dept: PRIMARY CARE CLINIC | Age: 29
End: 2024-08-20
Payer: COMMERCIAL

## 2024-08-20 DIAGNOSIS — E03.9 ACQUIRED HYPOTHYROIDISM: ICD-10-CM

## 2024-08-20 DIAGNOSIS — K59.01 SLOW TRANSIT CONSTIPATION: ICD-10-CM

## 2024-08-20 DIAGNOSIS — E03.9 ACQUIRED HYPOTHYROIDISM: Primary | ICD-10-CM

## 2024-08-20 LAB
T4 FREE: 1.1
TSH SERPL DL<=0.05 MIU/L-ACNC: 3.25 UIU/ML

## 2024-08-20 PROCEDURE — G8427 DOCREV CUR MEDS BY ELIG CLIN: HCPCS | Performed by: FAMILY MEDICINE

## 2024-08-20 PROCEDURE — 99213 OFFICE O/P EST LOW 20 MIN: CPT | Performed by: FAMILY MEDICINE

## 2024-08-20 ASSESSMENT — ENCOUNTER SYMPTOMS
COUGH: 0
ABDOMINAL PAIN: 0
CONSTIPATION: 1
NAUSEA: 0
SHORTNESS OF BREATH: 0
WHEEZING: 0
BACK PAIN: 0
DIARRHEA: 0
VOMITING: 0

## 2024-08-20 NOTE — PROGRESS NOTES
24  Laurel Carson : 1995 Sex: female  Age: 29 y.o.    Chief Complaint   Patient presents with    Discuss Labs     Patient states she had labs done and would like to discuss lab results     Fatigue    Constipation     HPI:  29 y.o. female presents today for 1 month follow up of chronic medical conditions.  Patient's chart, medical, surgical and medication history all reviewed.    Hypothyroidism  Patient presents for routine follow up of Hypothyroidism. Current symptoms: change in energy level, weight changes, and constipation . Patient denies diarrhea, heat / cold intolerance, nervousness, and palpitations. Symptoms have been well-controlled. No difficulty swallowing or masses felt.    Lab Results   Component Value Date    TSH 3.25 2024   Patient is currently taking Synthroid (OLVIN) 25 mcg daily.  Previously on levothyroxine but was not well controlled.  50 mcg too strong for patient.     In 2024, patient noted significant daytime fatigue.  Stated that she is very active normally and can do intense exercise, but almost every day while at work, feels like she can close her eyes and take a nap.  Also noted that she was having a hard time getting her HR up during exercise.  Labs demonstrated TSH of 4.99 and dose of levothyroxine was increased.  Patient notes that ever since dose was increased, she has been feeling palpitations, elevated BP, persistent headache and elevated HR.   [facility] due to L sided headache that occurred during sexual activity.  Labs and scans in the ER were all normal.  She was treat with IVF, Reglan, Toradol and Benadryl.  She notes some improvement in headache, but still in pain.  Now questions whether headache symptoms are due to muscles.  Improved with one massage, but then worsened with the second.  Treated with a Prednisone taper from an outside source and had significant side effects.     Anxiety  Patient

## 2024-08-20 NOTE — ASSESSMENT & PLAN NOTE
Levels improved with OLVIN Synthroid, but patient still fatigued and constipation.  Will increase to 37.5 mcg 2-3 days/week and recheck levels in 6 weeks.  Patient would like to see Mel, but having a hard time getting in.  Will try [facility].     Orders:    Seng Perry MD, Endocrinology, [place]    T4, Free; Future    TSH; Future

## 2024-08-20 NOTE — ASSESSMENT & PLAN NOTE
Worsening with change in thyroid.  Will try increasing dose of Synthroid to see if any effect.

## 2024-08-22 DIAGNOSIS — K21.9 GASTROESOPHAGEAL REFLUX DISEASE WITHOUT ESOPHAGITIS: ICD-10-CM

## 2024-08-22 RX ORDER — ESOMEPRAZOLE MAGNESIUM 40 MG/1
CAPSULE, DELAYED RELEASE ORAL
Qty: 90 CAPSULE | Refills: 1 | Status: SHIPPED | OUTPATIENT
Start: 2024-08-22

## 2024-08-22 NOTE — TELEPHONE ENCOUNTER
Last Appointment:  8/20/2024  Future Appointments   Date Time Provider Department Center   1/16/2025  3:40 PM Reagan Hendricks, DO [facility]

## 2024-08-26 DIAGNOSIS — E03.9 ACQUIRED HYPOTHYROIDISM: ICD-10-CM

## 2024-08-26 RX ORDER — LEVOTHYROXINE SODIUM 25 MCG
35.7 TABLET ORAL DAILY
Qty: 45 TABLET | Refills: 5 | Status: SHIPPED | OUTPATIENT
Start: 2024-08-26

## 2024-08-26 NOTE — TELEPHONE ENCOUNTER
Patient calling to following up if you received heart monitor results from [facility].     Also requesting script for synthroid increased dose.

## 2024-09-11 ENCOUNTER — PATIENT MESSAGE (OUTPATIENT)
Dept: PRIMARY CARE CLINIC | Age: 29
End: 2024-09-11

## 2024-09-11 ENCOUNTER — TELEPHONE (OUTPATIENT)
Dept: PRIMARY CARE CLINIC | Age: 29
End: 2024-09-11

## 2024-09-19 ENCOUNTER — TELEPHONE (OUTPATIENT)
Dept: PRIMARY CARE CLINIC | Age: 29
End: 2024-09-19

## 2024-09-25 DIAGNOSIS — R53.83 OTHER FATIGUE: ICD-10-CM

## 2024-09-25 DIAGNOSIS — R06.02 SOB (SHORTNESS OF BREATH): ICD-10-CM

## 2024-10-10 DIAGNOSIS — E03.9 ACQUIRED HYPOTHYROIDISM: ICD-10-CM

## 2024-10-10 LAB
ALBUMIN: 4.4 G/DL
ALP BLD-CCNC: 42 U/L
ALT SERPL-CCNC: 12 U/L
ANION GAP SERPL CALCULATED.3IONS-SCNC: NORMAL MMOL/L
AST SERPL-CCNC: 20 U/L
BILIRUB SERPL-MCNC: 0.8 MG/DL (ref 0.1–1.4)
BUN BLDV-MCNC: 19 MG/DL
CALCIUM SERPL-MCNC: 9.5 MG/DL
CHLORIDE BLD-SCNC: 103 MMOL/L
CO2: 29 MMOL/L
CREAT SERPL-MCNC: 0.88 MG/DL
GFR, ESTIMATED: 91
GLUCOSE BLD-MCNC: 79 MG/DL
POTASSIUM SERPL-SCNC: 4.1 MMOL/L
SODIUM BLD-SCNC: 139 MMOL/L
TOTAL PROTEIN: 6.8 G/DL (ref 6.4–8.2)

## 2024-10-31 ENCOUNTER — TELEPHONE (OUTPATIENT)
Dept: RHEUMATOLOGY | Age: 29
End: 2024-10-31

## 2024-10-31 NOTE — TELEPHONE ENCOUNTER
Patient called back to schedule, offered a sooner appointment but patient is starting new job so needed specific day and time, scheduled for Dec 19th at 1pm.     Electronically signed by Devan Eaton MA on 10/31/2024 at 4:01 PM

## 2024-10-31 NOTE — TELEPHONE ENCOUNTER
Called and left voicemail for patient to call back.    Electronically signed by Devan Eaton MA on 10/31/2024 at 9:59 AM

## 2024-10-31 NOTE — TELEPHONE ENCOUNTER
Patient called in requesting a sooner appointment than January due to having flare ups and joint pain all over. Patient made aware of no openings before January. Patient wants suggestions on what she can do in the mean time?     Please advise.    Electronically signed by Deavn Eaton MA on 10/31/2024 at 8:45 AM

## 2024-12-06 ENCOUNTER — OFFICE VISIT (OUTPATIENT)
Dept: FAMILY MEDICINE CLINIC | Age: 29
End: 2024-12-06
Payer: COMMERCIAL

## 2024-12-06 VITALS
OXYGEN SATURATION: 99 % | DIASTOLIC BLOOD PRESSURE: 74 MMHG | HEIGHT: 63 IN | HEART RATE: 74 BPM | SYSTOLIC BLOOD PRESSURE: 118 MMHG | BODY MASS INDEX: 25.34 KG/M2 | TEMPERATURE: 98.2 F | WEIGHT: 143 LBS | RESPIRATION RATE: 16 BRPM

## 2024-12-06 DIAGNOSIS — E03.9 ACQUIRED HYPOTHYROIDISM: Primary | ICD-10-CM

## 2024-12-06 DIAGNOSIS — R42 LIGHTHEADEDNESS: ICD-10-CM

## 2024-12-06 DIAGNOSIS — E16.2 HYPOGLYCEMIA: ICD-10-CM

## 2024-12-06 PROCEDURE — G8417 CALC BMI ABV UP PARAM F/U: HCPCS | Performed by: FAMILY MEDICINE

## 2024-12-06 PROCEDURE — G8427 DOCREV CUR MEDS BY ELIG CLIN: HCPCS | Performed by: FAMILY MEDICINE

## 2024-12-06 PROCEDURE — G8484 FLU IMMUNIZE NO ADMIN: HCPCS | Performed by: FAMILY MEDICINE

## 2024-12-06 PROCEDURE — 1036F TOBACCO NON-USER: CPT | Performed by: FAMILY MEDICINE

## 2024-12-06 PROCEDURE — 99214 OFFICE O/P EST MOD 30 MIN: CPT | Performed by: FAMILY MEDICINE

## 2024-12-06 RX ORDER — GLUCOSAMINE HCL/CHONDROITIN SU 500-400 MG
CAPSULE ORAL
Qty: 100 STRIP | Refills: 0 | Status: SHIPPED | OUTPATIENT
Start: 2024-12-06

## 2024-12-06 RX ORDER — BLOOD-GLUCOSE METER
1 KIT MISCELLANEOUS DAILY
Qty: 1 KIT | Refills: 0 | Status: SHIPPED | OUTPATIENT
Start: 2024-12-06

## 2024-12-06 RX ORDER — LANCETS 30 GAUGE
1 EACH MISCELLANEOUS DAILY
Qty: 100 EACH | Refills: 1 | Status: SHIPPED | OUTPATIENT
Start: 2024-12-06

## 2024-12-06 SDOH — ECONOMIC STABILITY: INCOME INSECURITY: HOW HARD IS IT FOR YOU TO PAY FOR THE VERY BASICS LIKE FOOD, HOUSING, MEDICAL CARE, AND HEATING?: NOT HARD AT ALL

## 2024-12-06 SDOH — ECONOMIC STABILITY: FOOD INSECURITY: WITHIN THE PAST 12 MONTHS, YOU WORRIED THAT YOUR FOOD WOULD RUN OUT BEFORE YOU GOT MONEY TO BUY MORE.: NEVER TRUE

## 2024-12-06 SDOH — ECONOMIC STABILITY: FOOD INSECURITY: WITHIN THE PAST 12 MONTHS, THE FOOD YOU BOUGHT JUST DIDN'T LAST AND YOU DIDN'T HAVE MONEY TO GET MORE.: NEVER TRUE

## 2024-12-06 ASSESSMENT — ENCOUNTER SYMPTOMS
COUGH: 0
NAUSEA: 0
DIARRHEA: 0
CONSTIPATION: 1
WHEEZING: 0
VOMITING: 0
SHORTNESS OF BREATH: 0
BACK PAIN: 0
ABDOMINAL PAIN: 0

## 2024-12-06 NOTE — PROGRESS NOTES
24  Laurel Carson : 1995 Sex: female  Age: 29 y.o.    Chief Complaint   Patient presents with    Hypothyroidism     HPI:  29 y.o. female presents today for 3 month follow up of chronic medical conditions.  Patient's chart, medical, surgical and medication history all reviewed.    Hypothyroidism  Patient presents for routine follow up of Hypothyroidism. Current symptoms: change in energy level, weight changes, and constipation . Patient denies diarrhea, heat / cold intolerance, nervousness, and palpitations. Symptoms have been well-controlled. No difficulty swallowing or masses felt.    Lab Results   Component Value Date    TSH 3.25 2024   Patient is currently taking Synthroid (OLVIN) 25 mcg daily.  Previously on levothyroxine but was not well controlled.  50 mcg too strong for patient.     In 2024, patient noted significant daytime fatigue.  Stated that she is very active normally and can do intense exercise, but almost every day while at work, feels like she can close her eyes and take a nap.  Also noted that she was having a hard time getting her HR up during exercise.  Labs demonstrated TSH of 4.99 and dose of levothyroxine was increased.  Patient noted that when dose was increased, she had been feeling palpitations, elevated BP, persistent headache and elevated HR.   [facility] due to L sided headache that occurred during sexual activity.  Labs and scans in the ER were all normal.  She was treat with IVF, Reglan, Toradol and Benadryl.  She noted some improvement in headache, but still had pain.  Then questioned whether headache symptoms were due to muscles.  Improved with one massage, but then worsened with the second.  Treated with a Prednisone taper from an outside source and had significant side effects.      Today, patient notes that she is now having issues with lightheadedness and near syncope especially with weight training in the gym.

## 2024-12-09 ENCOUNTER — TELEPHONE (OUTPATIENT)
Dept: FAMILY MEDICINE CLINIC | Age: 29
End: 2024-12-09

## 2024-12-09 NOTE — TELEPHONE ENCOUNTER
[facility] called, they need to change the diabetic supplies to   One touch ultra 2 kit, Delica 33 g Lancet, One touch Ultra test strips. This is what the insurance will cover.  Verbal order given to change.

## 2024-12-19 ENCOUNTER — OFFICE VISIT (OUTPATIENT)
Dept: RHEUMATOLOGY | Age: 29
End: 2024-12-19
Payer: COMMERCIAL

## 2024-12-19 VITALS
HEIGHT: 63 IN | HEART RATE: 79 BPM | SYSTOLIC BLOOD PRESSURE: 137 MMHG | WEIGHT: 140 LBS | DIASTOLIC BLOOD PRESSURE: 80 MMHG | BODY MASS INDEX: 24.8 KG/M2 | OXYGEN SATURATION: 99 %

## 2024-12-19 DIAGNOSIS — R76.8 POSITIVE ANA (ANTINUCLEAR ANTIBODY): Primary | ICD-10-CM

## 2024-12-19 DIAGNOSIS — M13.0 POLYARTHRITIS: ICD-10-CM

## 2024-12-19 DIAGNOSIS — E03.9 ACQUIRED HYPOTHYROIDISM: ICD-10-CM

## 2024-12-19 PROCEDURE — 99214 OFFICE O/P EST MOD 30 MIN: CPT | Performed by: INTERNAL MEDICINE

## 2024-12-19 PROCEDURE — G8420 CALC BMI NORM PARAMETERS: HCPCS | Performed by: INTERNAL MEDICINE

## 2024-12-19 PROCEDURE — G8427 DOCREV CUR MEDS BY ELIG CLIN: HCPCS | Performed by: INTERNAL MEDICINE

## 2024-12-19 PROCEDURE — 1036F TOBACCO NON-USER: CPT | Performed by: INTERNAL MEDICINE

## 2024-12-19 PROCEDURE — G8484 FLU IMMUNIZE NO ADMIN: HCPCS | Performed by: INTERNAL MEDICINE

## 2024-12-19 ASSESSMENT — ENCOUNTER SYMPTOMS
BACK PAIN: 1
DIARRHEA: 0
NAUSEA: 0
COLOR CHANGE: 0
VOMITING: 0
ABDOMINAL PAIN: 0
COUGH: 0
SHORTNESS OF BREATH: 1
TROUBLE SWALLOWING: 0

## 2024-12-19 NOTE — PROGRESS NOTES
The patient (or guardian, if applicable) and other individuals in attendance with the patient were advised that Artificial Intelligence will be utilized during this visit to record, process the conversation to generate a clinical note, and support improvement of the AI technology. The patient (or guardian, if applicable) and other individuals in attendance at the appointment consented to the use of AI, including the recording.                  Laurel Carson 1995 is a 29 y.o. female, here for evaluation of the following chief complaint(s):  Follow-up (Patient here per her request for chronic joint pains. )        Assessment & Plan   ASSESSMENT/PLAN:    Laurel Carson 1995 is a 29 y.o. female seen in follow-up for positive LOIS.    1.  Positive LOIS-she has a titer of 1: 1280 which is a significantly elevated titer.  She was last seen about a year and a half ago for positive LOIS.  At that time without no evidence of underlying systemic connective tissue disease.  Now for the last several months she has been having more pain off and on mainly in the left low back but to a lesser degree in the left knee and left ankle.  Her hands do feel little stiff but she has not really noticed swelling.  Her hands and feet do feel cold and she feels a burning sensation in the feet at times.  She has some sicca symptoms.  However no synovitis on exam.  She has a little tenderness over the left SI joint but pretty decent range of motion in the lumbar spine.  However, back pain is not typical of inflammatory back pain.  Of note she does have some nail changes of the second toes bilaterally.  She will see podiatry.  She has no evidence of psoriasis elsewhere on exam but if she is found to have nail psoriasis that may change things.  At this point I certainly would not label her with any underlying systemic autoimmune disease or inflammatory arthritis but will need further workup as below.    2.  Hypothyroidism-this

## 2025-01-02 ENCOUNTER — OFFICE VISIT (OUTPATIENT)
Dept: FAMILY MEDICINE CLINIC | Age: 30
End: 2025-01-02

## 2025-01-02 VITALS
BODY MASS INDEX: 25.58 KG/M2 | HEART RATE: 69 BPM | WEIGHT: 144.4 LBS | SYSTOLIC BLOOD PRESSURE: 122 MMHG | TEMPERATURE: 97.6 F | OXYGEN SATURATION: 100 % | DIASTOLIC BLOOD PRESSURE: 80 MMHG

## 2025-01-02 DIAGNOSIS — F07.81 POST CONCUSSIVE SYNDROME: Primary | ICD-10-CM

## 2025-01-02 DIAGNOSIS — S13.4XXA WHIPLASH INJURY TO NECK, INITIAL ENCOUNTER: ICD-10-CM

## 2025-01-02 RX ORDER — CYCLOBENZAPRINE HCL 5 MG
5 TABLET ORAL 3 TIMES DAILY PRN
Qty: 21 TABLET | Refills: 0 | Status: SHIPPED | OUTPATIENT
Start: 2025-01-02

## 2025-01-02 RX ORDER — METHYLPREDNISOLONE 4 MG/1
TABLET ORAL
Qty: 1 KIT | Refills: 0 | Status: SHIPPED | OUTPATIENT
Start: 2025-01-02

## 2025-01-02 NOTE — PROGRESS NOTES
Chief Complaint   Headache (Bumped in nose by dog last weekend, started having brain fog and headaches)    History of Present Illness   Source of history provided by: patient.       History of Present Illness  The patient is a 29-year-old female presenting for evaluation of a headache.    She reports an improvement in her headache today compared to previous days. The onset of her symptoms was on Saturday morning following an unexpected impact to her nose from a dog. Initially, she did not experience any significant discomfort and continued with her routine activities, including a gym session. However, two days later, upon returning to work, she began experiencing dizziness, difficulty focusing on her computer screen, and mild nausea. She also reported feeling fatigued and mentally foggy during her gym session, which led her to discontinue her workout. These symptoms have raised her suspicion of a potential mild concussion. She has been on leave since Monday and is scheduled to return to work tomorrow. She reports no bruising or swelling but suspects internal nasal tissue swelling. She found relief from ibuprofen and has not required any anti-inflammatories since then. She also reports a sensation of tightness on the right side of her back, which she attributes to possible whiplash. She experienced temporary nausea and stomach upset, which have since resolved. She expresses concern about the appropriateness of her use of NSAIDs and prednisone, given that Tylenol did not provide sufficient relief.    MEDICATIONS  Current: ibuprofen    All other ROS negative unless otherwise stated in HPI.      ROS    Unless otherwise stated in this report or unable to obtain because of the patient's clinical or mental status as evidenced by the medical record, this patients's positive and negative responses for Review of Systems, constitutional, psych, eyes, ENT, cardiovascular, respiratory, gastrointestinal, neurological,